# Patient Record
Sex: MALE | Race: WHITE | Employment: OTHER | URBAN - NONMETROPOLITAN AREA
[De-identification: names, ages, dates, MRNs, and addresses within clinical notes are randomized per-mention and may not be internally consistent; named-entity substitution may affect disease eponyms.]

---

## 2020-01-01 ENCOUNTER — APPOINTMENT (OUTPATIENT)
Dept: CT IMAGING | Facility: HOSPITAL | Age: 85
DRG: 283 | End: 2020-01-01
Attending: EMERGENCY MEDICINE
Payer: COMMERCIAL

## 2020-01-01 ENCOUNTER — APPOINTMENT (OUTPATIENT)
Dept: GENERAL RADIOLOGY | Facility: HOSPITAL | Age: 85
DRG: 283 | End: 2020-01-01
Attending: EMERGENCY MEDICINE
Payer: COMMERCIAL

## 2020-01-01 ENCOUNTER — APPOINTMENT (OUTPATIENT)
Dept: GENERAL RADIOLOGY | Facility: HOSPITAL | Age: 85
DRG: 283 | End: 2020-01-01
Attending: INTERNAL MEDICINE
Payer: COMMERCIAL

## 2020-01-01 ENCOUNTER — APPOINTMENT (OUTPATIENT)
Dept: GENERAL RADIOLOGY | Facility: HOSPITAL | Age: 85
DRG: 283 | End: 2020-01-01
Attending: NURSE PRACTITIONER
Payer: COMMERCIAL

## 2020-01-01 ENCOUNTER — HOSPITAL ENCOUNTER (INPATIENT)
Dept: CARDIOLOGY | Facility: HOSPITAL | Age: 85
DRG: 283 | End: 2020-10-21
Attending: INTERNAL MEDICINE
Payer: COMMERCIAL

## 2020-01-01 ENCOUNTER — HOSPITAL ENCOUNTER (INPATIENT)
Facility: HOSPITAL | Age: 85
LOS: 7 days | DRG: 283 | End: 2020-10-27
Attending: EMERGENCY MEDICINE | Admitting: INTERNAL MEDICINE
Payer: COMMERCIAL

## 2020-01-01 ENCOUNTER — APPOINTMENT (OUTPATIENT)
Dept: PHYSICAL THERAPY | Facility: HOSPITAL | Age: 85
DRG: 283 | End: 2020-01-01
Attending: NURSE PRACTITIONER
Payer: COMMERCIAL

## 2020-01-01 ENCOUNTER — APPOINTMENT (OUTPATIENT)
Dept: SPEECH THERAPY | Facility: HOSPITAL | Age: 85
DRG: 283 | End: 2020-01-01
Payer: COMMERCIAL

## 2020-01-01 ENCOUNTER — APPOINTMENT (OUTPATIENT)
Dept: SPEECH THERAPY | Facility: HOSPITAL | Age: 85
DRG: 283 | End: 2020-01-01
Attending: NURSE PRACTITIONER
Payer: COMMERCIAL

## 2020-01-01 VITALS
HEART RATE: 72 BPM | TEMPERATURE: 98.2 F | OXYGEN SATURATION: 71 % | RESPIRATION RATE: 28 BRPM | WEIGHT: 140.87 LBS | DIASTOLIC BLOOD PRESSURE: 30 MMHG | HEIGHT: 66 IN | SYSTOLIC BLOOD PRESSURE: 73 MMHG | BODY MASS INDEX: 22.64 KG/M2

## 2020-01-01 DIAGNOSIS — M62.81 GENERALIZED MUSCLE WEAKNESS: ICD-10-CM

## 2020-01-01 DIAGNOSIS — J18.9 PNEUMONIA OF LEFT UPPER LOBE DUE TO INFECTIOUS ORGANISM: Primary | ICD-10-CM

## 2020-01-01 DIAGNOSIS — I21.4 NSTEMI (NON-ST ELEVATED MYOCARDIAL INFARCTION) (H): ICD-10-CM

## 2020-01-01 DIAGNOSIS — R80.9 PROTEINURIA, UNSPECIFIED TYPE: ICD-10-CM

## 2020-01-01 LAB
ALBUMIN SERPL-MCNC: 2.1 G/DL (ref 3.4–5)
ALBUMIN SERPL-MCNC: 2.1 G/DL (ref 3.4–5)
ALBUMIN SERPL-MCNC: 2.4 G/DL (ref 3.4–5)
ALBUMIN UR-MCNC: 100 MG/DL
ALBUMIN UR-MCNC: 30 MG/DL
ALP SERPL-CCNC: 68 U/L (ref 40–150)
ALP SERPL-CCNC: 70 U/L (ref 40–150)
ALP SERPL-CCNC: 75 U/L (ref 40–150)
ALT SERPL W P-5'-P-CCNC: 39 U/L (ref 0–70)
ALT SERPL W P-5'-P-CCNC: 49 U/L (ref 0–70)
ALT SERPL W P-5'-P-CCNC: 52 U/L (ref 0–70)
AMORPH CRY #/AREA URNS HPF: ABNORMAL /HPF
AMORPH CRY #/AREA URNS HPF: ABNORMAL /HPF
ANION GAP SERPL CALCULATED.3IONS-SCNC: 6 MMOL/L (ref 3–14)
ANION GAP SERPL CALCULATED.3IONS-SCNC: 6 MMOL/L (ref 3–14)
ANION GAP SERPL CALCULATED.3IONS-SCNC: 7 MMOL/L (ref 3–14)
ANION GAP SERPL CALCULATED.3IONS-SCNC: 7 MMOL/L (ref 3–14)
ANION GAP SERPL CALCULATED.3IONS-SCNC: 8 MMOL/L (ref 3–14)
ANION GAP SERPL CALCULATED.3IONS-SCNC: 9 MMOL/L (ref 3–14)
ANION GAP SERPL CALCULATED.3IONS-SCNC: 9 MMOL/L (ref 3–14)
APPEARANCE UR: ABNORMAL
APPEARANCE UR: ABNORMAL
AST SERPL W P-5'-P-CCNC: 135 U/L (ref 0–45)
AST SERPL W P-5'-P-CCNC: 79 U/L (ref 0–45)
AST SERPL W P-5'-P-CCNC: 98 U/L (ref 0–45)
BACTERIA #/AREA URNS HPF: ABNORMAL /HPF
BACTERIA #/AREA URNS HPF: ABNORMAL /HPF
BACTERIA SPEC CULT: ABNORMAL
BACTERIA SPEC CULT: ABNORMAL
BACTERIA SPEC CULT: NORMAL
BASE DEFICIT BLDA-SCNC: 2.1 MMOL/L
BASOPHILS # BLD AUTO: 0 10E9/L (ref 0–0.2)
BASOPHILS NFR BLD AUTO: 0.2 %
BASOPHILS NFR BLD AUTO: 0.3 %
BASOPHILS NFR BLD AUTO: 0.3 %
BILIRUB SERPL-MCNC: 0.3 MG/DL (ref 0.2–1.3)
BILIRUB SERPL-MCNC: 0.3 MG/DL (ref 0.2–1.3)
BILIRUB SERPL-MCNC: 0.4 MG/DL (ref 0.2–1.3)
BILIRUB UR QL STRIP: NEGATIVE
BILIRUB UR QL STRIP: NEGATIVE
BUN SERPL-MCNC: 38 MG/DL (ref 7–30)
BUN SERPL-MCNC: 41 MG/DL (ref 7–30)
BUN SERPL-MCNC: 50 MG/DL (ref 7–30)
BUN SERPL-MCNC: 56 MG/DL (ref 7–30)
BUN SERPL-MCNC: 58 MG/DL (ref 7–30)
BUN SERPL-MCNC: 68 MG/DL (ref 7–30)
BUN SERPL-MCNC: 69 MG/DL (ref 7–30)
BUN SERPL-MCNC: 72 MG/DL (ref 7–30)
BUN SERPL-MCNC: 73 MG/DL (ref 7–30)
C DIFF TOX B STL QL: NEGATIVE
CALCIUM SERPL-MCNC: 7.8 MG/DL (ref 8.5–10.1)
CALCIUM SERPL-MCNC: 8 MG/DL (ref 8.5–10.1)
CALCIUM SERPL-MCNC: 8 MG/DL (ref 8.5–10.1)
CALCIUM SERPL-MCNC: 8.1 MG/DL (ref 8.5–10.1)
CALCIUM SERPL-MCNC: 8.2 MG/DL (ref 8.5–10.1)
CALCIUM SERPL-MCNC: 8.2 MG/DL (ref 8.5–10.1)
CALCIUM SERPL-MCNC: 8.4 MG/DL (ref 8.5–10.1)
CALCIUM SERPL-MCNC: 8.4 MG/DL (ref 8.5–10.1)
CALCIUM SERPL-MCNC: 9 MG/DL (ref 8.5–10.1)
CHLORIDE SERPL-SCNC: 111 MMOL/L (ref 94–109)
CHLORIDE SERPL-SCNC: 113 MMOL/L (ref 94–109)
CHLORIDE SERPL-SCNC: 114 MMOL/L (ref 94–109)
CHLORIDE SERPL-SCNC: 117 MMOL/L (ref 94–109)
CHLORIDE SERPL-SCNC: 117 MMOL/L (ref 94–109)
CHLORIDE SERPL-SCNC: 118 MMOL/L (ref 94–109)
CHLORIDE SERPL-SCNC: 120 MMOL/L (ref 94–109)
CHLORIDE SERPL-SCNC: 121 MMOL/L (ref 94–109)
CHLORIDE SERPL-SCNC: 122 MMOL/L (ref 94–109)
CK SERPL-CCNC: 279 U/L (ref 30–300)
CO2 SERPL-SCNC: 20 MMOL/L (ref 20–32)
CO2 SERPL-SCNC: 21 MMOL/L (ref 20–32)
CO2 SERPL-SCNC: 22 MMOL/L (ref 20–32)
CO2 SERPL-SCNC: 23 MMOL/L (ref 20–32)
CO2 SERPL-SCNC: 24 MMOL/L (ref 20–32)
CO2 SERPL-SCNC: 25 MMOL/L (ref 20–32)
CO2 SERPL-SCNC: 25 MMOL/L (ref 20–32)
COLOR UR AUTO: YELLOW
COLOR UR AUTO: YELLOW
CREAT SERPL-MCNC: 1.4 MG/DL (ref 0.66–1.25)
CREAT SERPL-MCNC: 1.42 MG/DL (ref 0.66–1.25)
CREAT SERPL-MCNC: 1.6 MG/DL (ref 0.66–1.25)
CREAT SERPL-MCNC: 1.65 MG/DL (ref 0.66–1.25)
CREAT SERPL-MCNC: 1.65 MG/DL (ref 0.66–1.25)
CREAT SERPL-MCNC: 2.01 MG/DL (ref 0.66–1.25)
CREAT SERPL-MCNC: 2.04 MG/DL (ref 0.66–1.25)
CREAT SERPL-MCNC: 2.05 MG/DL (ref 0.66–1.25)
CREAT SERPL-MCNC: 2.15 MG/DL (ref 0.66–1.25)
CRP SERPL-MCNC: 148 MG/L (ref 0–8)
CRP SERPL-MCNC: 158 MG/L (ref 0–8)
D DIMER PPP FEU-MCNC: 1.8 UG/ML FEU (ref 0–0.5)
DIFFERENTIAL METHOD BLD: ABNORMAL
EOSINOPHIL # BLD AUTO: 0 10E9/L (ref 0–0.7)
EOSINOPHIL NFR BLD AUTO: 0 %
EOSINOPHIL NFR BLD AUTO: 0.1 %
EOSINOPHIL NFR BLD AUTO: 0.1 %
EOSINOPHIL NFR BLD AUTO: 0.2 %
EOSINOPHIL NFR BLD AUTO: 0.3 %
ERYTHROCYTE [DISTWIDTH] IN BLOOD BY AUTOMATED COUNT: 15.6 % (ref 10–15)
ERYTHROCYTE [DISTWIDTH] IN BLOOD BY AUTOMATED COUNT: 15.8 % (ref 10–15)
ERYTHROCYTE [DISTWIDTH] IN BLOOD BY AUTOMATED COUNT: 15.8 % (ref 10–15)
ERYTHROCYTE [DISTWIDTH] IN BLOOD BY AUTOMATED COUNT: 15.9 % (ref 10–15)
ERYTHROCYTE [DISTWIDTH] IN BLOOD BY AUTOMATED COUNT: 16 % (ref 10–15)
ERYTHROCYTE [DISTWIDTH] IN BLOOD BY AUTOMATED COUNT: 16.2 % (ref 10–15)
ERYTHROCYTE [DISTWIDTH] IN BLOOD BY AUTOMATED COUNT: 16.3 % (ref 10–15)
GFR SERPL CREATININE-BSD FRML MDRD: 24 ML/MIN/{1.73_M2}
GFR SERPL CREATININE-BSD FRML MDRD: 25 ML/MIN/{1.73_M2}
GFR SERPL CREATININE-BSD FRML MDRD: 25 ML/MIN/{1.73_M2}
GFR SERPL CREATININE-BSD FRML MDRD: 26 ML/MIN/{1.73_M2}
GFR SERPL CREATININE-BSD FRML MDRD: 33 ML/MIN/{1.73_M2}
GFR SERPL CREATININE-BSD FRML MDRD: 33 ML/MIN/{1.73_M2}
GFR SERPL CREATININE-BSD FRML MDRD: 34 ML/MIN/{1.73_M2}
GFR SERPL CREATININE-BSD FRML MDRD: 39 ML/MIN/{1.73_M2}
GFR SERPL CREATININE-BSD FRML MDRD: 40 ML/MIN/{1.73_M2}
GLUCOSE SERPL-MCNC: 109 MG/DL (ref 70–99)
GLUCOSE SERPL-MCNC: 114 MG/DL (ref 70–99)
GLUCOSE SERPL-MCNC: 122 MG/DL (ref 70–99)
GLUCOSE SERPL-MCNC: 131 MG/DL (ref 70–99)
GLUCOSE SERPL-MCNC: 133 MG/DL (ref 70–99)
GLUCOSE SERPL-MCNC: 143 MG/DL (ref 70–99)
GLUCOSE SERPL-MCNC: 145 MG/DL (ref 70–99)
GLUCOSE SERPL-MCNC: 160 MG/DL (ref 70–99)
GLUCOSE SERPL-MCNC: 165 MG/DL (ref 70–99)
GLUCOSE UR STRIP-MCNC: 30 MG/DL
GLUCOSE UR STRIP-MCNC: NEGATIVE MG/DL
GRAM STN SPEC: ABNORMAL
GRAN CASTS #/AREA URNS LPF: 14 /LPF
HCO3 BLD-SCNC: 22 MMOL/L (ref 21–28)
HCT VFR BLD AUTO: 27.1 % (ref 40–53)
HCT VFR BLD AUTO: 27.1 % (ref 40–53)
HCT VFR BLD AUTO: 27.3 % (ref 40–53)
HCT VFR BLD AUTO: 27.4 % (ref 40–53)
HCT VFR BLD AUTO: 29.2 % (ref 40–53)
HCT VFR BLD AUTO: 29.3 % (ref 40–53)
HCT VFR BLD AUTO: 34.6 % (ref 40–53)
HGB BLD-MCNC: 11.4 G/DL (ref 13.3–17.7)
HGB BLD-MCNC: 9 G/DL (ref 13.3–17.7)
HGB BLD-MCNC: 9.1 G/DL (ref 13.3–17.7)
HGB BLD-MCNC: 9.2 G/DL (ref 13.3–17.7)
HGB BLD-MCNC: 9.2 G/DL (ref 13.3–17.7)
HGB BLD-MCNC: 9.6 G/DL (ref 13.3–17.7)
HGB BLD-MCNC: 9.8 G/DL (ref 13.3–17.7)
HGB UR QL STRIP: ABNORMAL
HGB UR QL STRIP: ABNORMAL
HYALINE CASTS #/AREA URNS LPF: 1 /LPF
HYALINE CASTS #/AREA URNS LPF: 5 /LPF
IMM GRANULOCYTES # BLD: 0.1 10E9/L (ref 0–0.4)
IMM GRANULOCYTES # BLD: 0.1 10E9/L (ref 0–0.4)
IMM GRANULOCYTES # BLD: 0.2 10E9/L (ref 0–0.4)
IMM GRANULOCYTES # BLD: 0.2 10E9/L (ref 0–0.4)
IMM GRANULOCYTES # BLD: 0.3 10E9/L (ref 0–0.4)
IMM GRANULOCYTES NFR BLD: 0.7 %
IMM GRANULOCYTES NFR BLD: 0.9 %
IMM GRANULOCYTES NFR BLD: 1.4 %
IMM GRANULOCYTES NFR BLD: 1.9 %
IMM GRANULOCYTES NFR BLD: 1.9 %
KETONES UR STRIP-MCNC: NEGATIVE MG/DL
KETONES UR STRIP-MCNC: NEGATIVE MG/DL
LABORATORY COMMENT REPORT: NORMAL
LACTATE BLD-SCNC: 1.6 MMOL/L (ref 0.7–2)
LACTATE BLD-SCNC: 3.4 MMOL/L (ref 0.7–2)
LEUKOCYTE ESTERASE UR QL STRIP: NEGATIVE
LEUKOCYTE ESTERASE UR QL STRIP: NEGATIVE
LIPASE SERPL-CCNC: 68 U/L (ref 73–393)
LYMPHOCYTES # BLD AUTO: 0.3 10E9/L (ref 0.8–5.3)
LYMPHOCYTES # BLD AUTO: 0.4 10E9/L (ref 0.8–5.3)
LYMPHOCYTES # BLD AUTO: 0.4 10E9/L (ref 0.8–5.3)
LYMPHOCYTES # BLD AUTO: 0.5 10E9/L (ref 0.8–5.3)
LYMPHOCYTES # BLD AUTO: 0.7 10E9/L (ref 0.8–5.3)
LYMPHOCYTES NFR BLD AUTO: 2.6 %
LYMPHOCYTES NFR BLD AUTO: 3.4 %
LYMPHOCYTES NFR BLD AUTO: 3.5 %
LYMPHOCYTES NFR BLD AUTO: 5.1 %
LYMPHOCYTES NFR BLD AUTO: 5.9 %
Lab: NORMAL
MCH RBC QN AUTO: 32.3 PG (ref 26.5–33)
MCH RBC QN AUTO: 32.5 PG (ref 26.5–33)
MCH RBC QN AUTO: 32.7 PG (ref 26.5–33)
MCH RBC QN AUTO: 32.8 PG (ref 26.5–33)
MCH RBC QN AUTO: 32.9 PG (ref 26.5–33)
MCH RBC QN AUTO: 33.1 PG (ref 26.5–33)
MCH RBC QN AUTO: 33.1 PG (ref 26.5–33)
MCHC RBC AUTO-ENTMCNC: 32.8 G/DL (ref 31.5–36.5)
MCHC RBC AUTO-ENTMCNC: 32.9 G/DL (ref 31.5–36.5)
MCHC RBC AUTO-ENTMCNC: 33.2 G/DL (ref 31.5–36.5)
MCHC RBC AUTO-ENTMCNC: 33.3 G/DL (ref 31.5–36.5)
MCHC RBC AUTO-ENTMCNC: 33.6 G/DL (ref 31.5–36.5)
MCHC RBC AUTO-ENTMCNC: 33.6 G/DL (ref 31.5–36.5)
MCHC RBC AUTO-ENTMCNC: 33.9 G/DL (ref 31.5–36.5)
MCV RBC AUTO: 100 FL (ref 78–100)
MCV RBC AUTO: 97 FL (ref 78–100)
MCV RBC AUTO: 98 FL (ref 78–100)
MCV RBC AUTO: 98 FL (ref 78–100)
MCV RBC AUTO: 99 FL (ref 78–100)
MONOCYTES # BLD AUTO: 0.5 10E9/L (ref 0–1.3)
MONOCYTES # BLD AUTO: 0.6 10E9/L (ref 0–1.3)
MONOCYTES # BLD AUTO: 0.7 10E9/L (ref 0–1.3)
MONOCYTES NFR BLD AUTO: 4.9 %
MONOCYTES NFR BLD AUTO: 5 %
MONOCYTES NFR BLD AUTO: 5.7 %
MONOCYTES NFR BLD AUTO: 5.7 %
MONOCYTES NFR BLD AUTO: 6.2 %
MUCOUS THREADS #/AREA URNS LPF: PRESENT /LPF
MUCOUS THREADS #/AREA URNS LPF: PRESENT /LPF
NEUTROPHILS # BLD AUTO: 10.1 10E9/L (ref 1.6–8.3)
NEUTROPHILS # BLD AUTO: 10.5 10E9/L (ref 1.6–8.3)
NEUTROPHILS # BLD AUTO: 11.3 10E9/L (ref 1.6–8.3)
NEUTROPHILS # BLD AUTO: 11.9 10E9/L (ref 1.6–8.3)
NEUTROPHILS # BLD AUTO: 7.3 10E9/L (ref 1.6–8.3)
NEUTROPHILS NFR BLD AUTO: 86.6 %
NEUTROPHILS NFR BLD AUTO: 87.6 %
NEUTROPHILS NFR BLD AUTO: 89.1 %
NEUTROPHILS NFR BLD AUTO: 89.5 %
NEUTROPHILS NFR BLD AUTO: 90.5 %
NITRATE UR QL: NEGATIVE
NITRATE UR QL: NEGATIVE
NRBC # BLD AUTO: 0 10*3/UL
NRBC # BLD AUTO: 0.1 10*3/UL
NRBC BLD AUTO-RTO: 0 /100
NRBC BLD AUTO-RTO: 1 /100
NT-PROBNP SERPL-MCNC: ABNORMAL PG/ML (ref 0–1800)
O2/TOTAL GAS SETTING VFR VENT: 21 %
OXYHGB MFR BLD: 93 % (ref 92–100)
PCO2 BLD: 33 MM HG (ref 35–45)
PH BLD: 7.43 PH (ref 7.35–7.45)
PH UR STRIP: 5.5 PH (ref 4.7–8)
PH UR STRIP: 5.5 PH (ref 4.7–8)
PLATELET # BLD AUTO: 325 10E9/L (ref 150–450)
PLATELET # BLD AUTO: 353 10E9/L (ref 150–450)
PLATELET # BLD AUTO: 366 10E9/L (ref 150–450)
PLATELET # BLD AUTO: 417 10E9/L (ref 150–450)
PLATELET # BLD AUTO: 476 10E9/L (ref 150–450)
PLATELET # BLD AUTO: 478 10E9/L (ref 150–450)
PLATELET # BLD AUTO: 482 10E9/L (ref 150–450)
PO2 BLD: 70 MM HG (ref 80–105)
POTASSIUM SERPL-SCNC: 2.9 MMOL/L (ref 3.4–5.3)
POTASSIUM SERPL-SCNC: 3.2 MMOL/L (ref 3.4–5.3)
POTASSIUM SERPL-SCNC: 3.5 MMOL/L (ref 3.4–5.3)
POTASSIUM SERPL-SCNC: 3.6 MMOL/L (ref 3.4–5.3)
POTASSIUM SERPL-SCNC: 3.6 MMOL/L (ref 3.4–5.3)
POTASSIUM SERPL-SCNC: 3.7 MMOL/L (ref 3.4–5.3)
POTASSIUM SERPL-SCNC: 3.8 MMOL/L (ref 3.4–5.3)
PROCALCITONIN SERPL-MCNC: 0.25 NG/ML
PROCALCITONIN SERPL-MCNC: 0.32 NG/ML
PROCALCITONIN SERPL-MCNC: 0.4 NG/ML
PROCALCITONIN SERPL-MCNC: 0.62 NG/ML
PROCALCITONIN SERPL-MCNC: 0.8 NG/ML
PROCALCITONIN SERPL-MCNC: 1.29 NG/ML
PROT SERPL-MCNC: 5.8 G/DL (ref 6.8–8.8)
PROT SERPL-MCNC: 6 G/DL (ref 6.8–8.8)
PROT SERPL-MCNC: 6 G/DL (ref 6.8–8.8)
RBC # BLD AUTO: 2.74 10E12/L (ref 4.4–5.9)
RBC # BLD AUTO: 2.78 10E12/L (ref 4.4–5.9)
RBC # BLD AUTO: 2.78 10E12/L (ref 4.4–5.9)
RBC # BLD AUTO: 2.8 10E12/L (ref 4.4–5.9)
RBC # BLD AUTO: 2.97 10E12/L (ref 4.4–5.9)
RBC # BLD AUTO: 3 10E12/L (ref 4.4–5.9)
RBC # BLD AUTO: 3.46 10E12/L (ref 4.4–5.9)
RBC #/AREA URNS AUTO: 2 /HPF (ref 0–2)
RBC #/AREA URNS AUTO: 4 /HPF (ref 0–2)
SARS-COV-2 RNA SPEC QL NAA+PROBE: NEGATIVE
SARS-COV-2 RNA SPEC QL NAA+PROBE: NORMAL
SARS-COV-2 RNA SPEC QL NAA+PROBE: NOT DETECTED
SODIUM SERPL-SCNC: 143 MMOL/L (ref 133–144)
SODIUM SERPL-SCNC: 144 MMOL/L (ref 133–144)
SODIUM SERPL-SCNC: 144 MMOL/L (ref 133–144)
SODIUM SERPL-SCNC: 146 MMOL/L (ref 133–144)
SODIUM SERPL-SCNC: 148 MMOL/L (ref 133–144)
SODIUM SERPL-SCNC: 149 MMOL/L (ref 133–144)
SODIUM SERPL-SCNC: 150 MMOL/L (ref 133–144)
SODIUM SERPL-SCNC: 151 MMOL/L (ref 133–144)
SODIUM SERPL-SCNC: 152 MMOL/L (ref 133–144)
SOURCE: ABNORMAL
SOURCE: ABNORMAL
SP GR UR STRIP: 1.02 (ref 1–1.03)
SP GR UR STRIP: 1.02 (ref 1–1.03)
SPECIMEN SOURCE: ABNORMAL
SPECIMEN SOURCE: NORMAL
SQUAMOUS #/AREA URNS AUTO: 0 /HPF (ref 0–1)
SQUAMOUS #/AREA URNS AUTO: 0 /HPF (ref 0–1)
TROPONIN I SERPL-MCNC: 12.64 UG/L (ref 0–0.04)
TROPONIN I SERPL-MCNC: 14.7 UG/L (ref 0–0.04)
TROPONIN I SERPL-MCNC: 19.86 UG/L (ref 0–0.04)
TROPONIN I SERPL-MCNC: 26.44 UG/L (ref 0–0.04)
TROPONIN I SERPL-MCNC: 29 UG/L (ref 0–0.04)
TROPONIN I SERPL-MCNC: 4.52 UG/L (ref 0–0.04)
TROPONIN I SERPL-MCNC: 5.79 UG/L (ref 0–0.04)
TROPONIN I SERPL-MCNC: 6.71 UG/L (ref 0–0.04)
TROPONIN I SERPL-MCNC: 7.55 UG/L (ref 0–0.04)
TROPONIN I SERPL-MCNC: 8.24 UG/L (ref 0–0.04)
TSH SERPL DL<=0.005 MIU/L-ACNC: 0.22 MU/L (ref 0.4–4)
UROBILINOGEN UR STRIP-MCNC: NORMAL MG/DL (ref 0–2)
UROBILINOGEN UR STRIP-MCNC: NORMAL MG/DL (ref 0–2)
WBC # BLD AUTO: 11.6 10E9/L (ref 4–11)
WBC # BLD AUTO: 11.8 10E9/L (ref 4–11)
WBC # BLD AUTO: 12.5 10E9/L (ref 4–11)
WBC # BLD AUTO: 13.3 10E9/L (ref 4–11)
WBC # BLD AUTO: 8.4 10E9/L (ref 4–11)
WBC # BLD AUTO: 8.8 10E9/L (ref 4–11)
WBC # BLD AUTO: 9.9 10E9/L (ref 4–11)
WBC #/AREA URNS AUTO: 6 /HPF (ref 0–5)
WBC #/AREA URNS AUTO: 7 /HPF (ref 0–5)

## 2020-01-01 PROCEDURE — 250N000011 HC RX IP 250 OP 636: Performed by: INTERNAL MEDICINE

## 2020-01-01 PROCEDURE — 36415 COLL VENOUS BLD VENIPUNCTURE: CPT | Performed by: NURSE PRACTITIONER

## 2020-01-01 PROCEDURE — 120N000001 HC R&B MED SURG/OB

## 2020-01-01 PROCEDURE — 80053 COMPREHEN METABOLIC PANEL: CPT | Performed by: INTERNAL MEDICINE

## 2020-01-01 PROCEDURE — 250N000011 HC RX IP 250 OP 636: Performed by: EMERGENCY MEDICINE

## 2020-01-01 PROCEDURE — 93010 ELECTROCARDIOGRAM REPORT: CPT | Performed by: INTERNAL MEDICINE

## 2020-01-01 PROCEDURE — 250N000013 HC RX MED GY IP 250 OP 250 PS 637: Performed by: INTERNAL MEDICINE

## 2020-01-01 PROCEDURE — 83880 ASSAY OF NATRIURETIC PEPTIDE: CPT | Performed by: EMERGENCY MEDICINE

## 2020-01-01 PROCEDURE — 999N000157 HC STATISTIC RCP TIME EA 10 MIN

## 2020-01-01 PROCEDURE — 80048 BASIC METABOLIC PNL TOTAL CA: CPT | Performed by: NURSE PRACTITIONER

## 2020-01-01 PROCEDURE — 99233 SBSQ HOSP IP/OBS HIGH 50: CPT | Performed by: NURSE PRACTITIONER

## 2020-01-01 PROCEDURE — 87040 BLOOD CULTURE FOR BACTERIA: CPT | Performed by: EMERGENCY MEDICINE

## 2020-01-01 PROCEDURE — 83605 ASSAY OF LACTIC ACID: CPT | Performed by: INTERNAL MEDICINE

## 2020-01-01 PROCEDURE — 85027 COMPLETE CBC AUTOMATED: CPT | Performed by: INTERNAL MEDICINE

## 2020-01-01 PROCEDURE — 85025 COMPLETE CBC W/AUTO DIFF WBC: CPT | Performed by: NURSE PRACTITIONER

## 2020-01-01 PROCEDURE — 36600 WITHDRAWAL OF ARTERIAL BLOOD: CPT

## 2020-01-01 PROCEDURE — 99233 SBSQ HOSP IP/OBS HIGH 50: CPT | Performed by: INTERNAL MEDICINE

## 2020-01-01 PROCEDURE — 258N000003 HC RX IP 258 OP 636: Performed by: INTERNAL MEDICINE

## 2020-01-01 PROCEDURE — 255N000002 HC RX 255 OP 636: Performed by: INTERNAL MEDICINE

## 2020-01-01 PROCEDURE — 99223 1ST HOSP IP/OBS HIGH 75: CPT | Performed by: INTERNAL MEDICINE

## 2020-01-01 PROCEDURE — 86140 C-REACTIVE PROTEIN: CPT | Performed by: NURSE PRACTITIONER

## 2020-01-01 PROCEDURE — 250N000013 HC RX MED GY IP 250 OP 250 PS 637: Performed by: EMERGENCY MEDICINE

## 2020-01-01 PROCEDURE — 87070 CULTURE OTHR SPECIMN AEROBIC: CPT | Performed by: INTERNAL MEDICINE

## 2020-01-01 PROCEDURE — 84145 PROCALCITONIN (PCT): CPT | Performed by: NURSE PRACTITIONER

## 2020-01-01 PROCEDURE — 85379 FIBRIN DEGRADATION QUANT: CPT | Performed by: EMERGENCY MEDICINE

## 2020-01-01 PROCEDURE — 93306 TTE W/DOPPLER COMPLETE: CPT | Mod: 26 | Performed by: INTERNAL MEDICINE

## 2020-01-01 PROCEDURE — 84145 PROCALCITONIN (PCT): CPT | Performed by: INTERNAL MEDICINE

## 2020-01-01 PROCEDURE — 84145 PROCALCITONIN (PCT): CPT | Performed by: EMERGENCY MEDICINE

## 2020-01-01 PROCEDURE — 87205 SMEAR GRAM STAIN: CPT | Performed by: NURSE PRACTITIONER

## 2020-01-01 PROCEDURE — 94640 AIRWAY INHALATION TREATMENT: CPT

## 2020-01-01 PROCEDURE — 96367 TX/PROPH/DG ADDL SEQ IV INF: CPT

## 2020-01-01 PROCEDURE — 250N000011 HC RX IP 250 OP 636: Performed by: RADIOLOGY

## 2020-01-01 PROCEDURE — 250N000013 HC RX MED GY IP 250 OP 250 PS 637: Performed by: NURSE PRACTITIONER

## 2020-01-01 PROCEDURE — 258N000003 HC RX IP 258 OP 636: Performed by: EMERGENCY MEDICINE

## 2020-01-01 PROCEDURE — 85025 COMPLETE CBC W/AUTO DIFF WBC: CPT | Performed by: EMERGENCY MEDICINE

## 2020-01-01 PROCEDURE — 250N000009 HC RX 250

## 2020-01-01 PROCEDURE — 120N000004 HC R&B MS OVERFLOW

## 2020-01-01 PROCEDURE — 92610 EVALUATE SWALLOWING FUNCTION: CPT | Mod: GN

## 2020-01-01 PROCEDURE — 87635 SARS-COV-2 COVID-19 AMP PRB: CPT | Performed by: NURSE PRACTITIONER

## 2020-01-01 PROCEDURE — 73523 X-RAY EXAM HIPS BI 5/> VIEWS: CPT

## 2020-01-01 PROCEDURE — 93005 ELECTROCARDIOGRAM TRACING: CPT

## 2020-01-01 PROCEDURE — 84484 ASSAY OF TROPONIN QUANT: CPT | Performed by: NURSE PRACTITIONER

## 2020-01-01 PROCEDURE — 96368 THER/DIAG CONCURRENT INF: CPT

## 2020-01-01 PROCEDURE — 258N000003 HC RX IP 258 OP 636: Performed by: NURSE PRACTITIONER

## 2020-01-01 PROCEDURE — 250N000011 HC RX IP 250 OP 636: Performed by: NURSE PRACTITIONER

## 2020-01-01 PROCEDURE — 36415 COLL VENOUS BLD VENIPUNCTURE: CPT | Performed by: INTERNAL MEDICINE

## 2020-01-01 PROCEDURE — 87205 SMEAR GRAM STAIN: CPT | Performed by: INTERNAL MEDICINE

## 2020-01-01 PROCEDURE — 99284 EMERGENCY DEPT VISIT MOD MDM: CPT | Performed by: EMERGENCY MEDICINE

## 2020-01-01 PROCEDURE — 82550 ASSAY OF CK (CPK): CPT | Performed by: EMERGENCY MEDICINE

## 2020-01-01 PROCEDURE — 99238 HOSP IP/OBS DSCHRG MGMT 30/<: CPT | Performed by: INTERNAL MEDICINE

## 2020-01-01 PROCEDURE — 80048 BASIC METABOLIC PNL TOTAL CA: CPT | Performed by: EMERGENCY MEDICINE

## 2020-01-01 PROCEDURE — U0003 INFECTIOUS AGENT DETECTION BY NUCLEIC ACID (DNA OR RNA); SEVERE ACUTE RESPIRATORY SYNDROME CORONAVIRUS 2 (SARS-COV-2) (CORONAVIRUS DISEASE [COVID-19]), AMPLIFIED PROBE TECHNIQUE, MAKING USE OF HIGH THROUGHPUT TECHNOLOGIES AS DESCRIBED BY CMS-2020-01-R: HCPCS | Performed by: EMERGENCY MEDICINE

## 2020-01-01 PROCEDURE — 96361 HYDRATE IV INFUSION ADD-ON: CPT

## 2020-01-01 PROCEDURE — 96365 THER/PROPH/DIAG IV INF INIT: CPT

## 2020-01-01 PROCEDURE — 83880 ASSAY OF NATRIURETIC PEPTIDE: CPT | Performed by: NURSE PRACTITIONER

## 2020-01-01 PROCEDURE — 96366 THER/PROPH/DIAG IV INF ADDON: CPT

## 2020-01-01 PROCEDURE — 84484 ASSAY OF TROPONIN QUANT: CPT | Performed by: INTERNAL MEDICINE

## 2020-01-01 PROCEDURE — 97162 PT EVAL MOD COMPLEX 30 MIN: CPT | Mod: GP

## 2020-01-01 PROCEDURE — 71045 X-RAY EXAM CHEST 1 VIEW: CPT

## 2020-01-01 PROCEDURE — 36415 COLL VENOUS BLD VENIPUNCTURE: CPT | Performed by: EMERGENCY MEDICINE

## 2020-01-01 PROCEDURE — 80053 COMPREHEN METABOLIC PANEL: CPT | Performed by: NURSE PRACTITIONER

## 2020-01-01 PROCEDURE — 87493 C DIFF AMPLIFIED PROBE: CPT | Performed by: NURSE PRACTITIONER

## 2020-01-01 PROCEDURE — 99285 EMERGENCY DEPT VISIT HI MDM: CPT | Mod: 25

## 2020-01-01 PROCEDURE — 82805 BLOOD GASES W/O2 SATURATION: CPT | Performed by: EMERGENCY MEDICINE

## 2020-01-01 PROCEDURE — 81001 URINALYSIS AUTO W/SCOPE: CPT | Performed by: NURSE PRACTITIONER

## 2020-01-01 PROCEDURE — 71046 X-RAY EXAM CHEST 2 VIEWS: CPT

## 2020-01-01 PROCEDURE — 93005 ELECTROCARDIOGRAM TRACING: CPT | Mod: 76

## 2020-01-01 PROCEDURE — 83605 ASSAY OF LACTIC ACID: CPT | Performed by: EMERGENCY MEDICINE

## 2020-01-01 PROCEDURE — 36416 COLLJ CAPILLARY BLOOD SPEC: CPT | Performed by: INTERNAL MEDICINE

## 2020-01-01 PROCEDURE — 92526 ORAL FUNCTION THERAPY: CPT | Mod: GN

## 2020-01-01 PROCEDURE — 96376 TX/PRO/DX INJ SAME DRUG ADON: CPT

## 2020-01-01 PROCEDURE — 71275 CT ANGIOGRAPHY CHEST: CPT

## 2020-01-01 PROCEDURE — 83880 ASSAY OF NATRIURETIC PEPTIDE: CPT | Performed by: INTERNAL MEDICINE

## 2020-01-01 PROCEDURE — 86140 C-REACTIVE PROTEIN: CPT | Performed by: EMERGENCY MEDICINE

## 2020-01-01 PROCEDURE — 81001 URINALYSIS AUTO W/SCOPE: CPT | Performed by: EMERGENCY MEDICINE

## 2020-01-01 PROCEDURE — 250N000009 HC RX 250: Performed by: INTERNAL MEDICINE

## 2020-01-01 PROCEDURE — 84443 ASSAY THYROID STIM HORMONE: CPT | Performed by: INTERNAL MEDICINE

## 2020-01-01 PROCEDURE — 84484 ASSAY OF TROPONIN QUANT: CPT | Performed by: EMERGENCY MEDICINE

## 2020-01-01 PROCEDURE — 83690 ASSAY OF LIPASE: CPT | Performed by: EMERGENCY MEDICINE

## 2020-01-01 RX ORDER — LORAZEPAM 2 MG/ML
1-2 INJECTION INTRAMUSCULAR
Status: DISCONTINUED | OUTPATIENT
Start: 2020-01-01 | End: 2020-01-01 | Stop reason: HOSPADM

## 2020-01-01 RX ORDER — SCOLOPAMINE TRANSDERMAL SYSTEM 1 MG/1
1 PATCH, EXTENDED RELEASE TRANSDERMAL
Status: DISCONTINUED | OUTPATIENT
Start: 2020-01-01 | End: 2020-01-01 | Stop reason: HOSPADM

## 2020-01-01 RX ORDER — DEXTROSE MONOHYDRATE 50 MG/ML
INJECTION, SOLUTION INTRAVENOUS CONTINUOUS
Status: DISCONTINUED | OUTPATIENT
Start: 2020-01-01 | End: 2020-01-01 | Stop reason: HOSPADM

## 2020-01-01 RX ORDER — POTASSIUM CHLORIDE 1.5 G/1.58G
20-40 POWDER, FOR SOLUTION ORAL
Status: DISCONTINUED | OUTPATIENT
Start: 2020-01-01 | End: 2020-01-01 | Stop reason: HOSPADM

## 2020-01-01 RX ORDER — IPRATROPIUM BROMIDE AND ALBUTEROL SULFATE 2.5; .5 MG/3ML; MG/3ML
3 SOLUTION RESPIRATORY (INHALATION)
Status: DISCONTINUED | OUTPATIENT
Start: 2020-01-01 | End: 2020-01-01

## 2020-01-01 RX ORDER — IPRATROPIUM BROMIDE AND ALBUTEROL SULFATE 2.5; .5 MG/3ML; MG/3ML
SOLUTION RESPIRATORY (INHALATION)
Status: COMPLETED
Start: 2020-01-01 | End: 2020-01-01

## 2020-01-01 RX ORDER — ASPIRIN 81 MG/1
81 TABLET ORAL DAILY
Status: DISCONTINUED | OUTPATIENT
Start: 2020-01-01 | End: 2020-01-01

## 2020-01-01 RX ORDER — IPRATROPIUM BROMIDE AND ALBUTEROL SULFATE 2.5; .5 MG/3ML; MG/3ML
3 SOLUTION RESPIRATORY (INHALATION) EVERY 4 HOURS PRN
Status: DISCONTINUED | OUTPATIENT
Start: 2020-01-01 | End: 2020-01-01 | Stop reason: HOSPADM

## 2020-01-01 RX ORDER — ASPIRIN 81 MG/1
324 TABLET, CHEWABLE ORAL ONCE
Status: COMPLETED | OUTPATIENT
Start: 2020-01-01 | End: 2020-01-01

## 2020-01-01 RX ORDER — MORPHINE SULFATE 100 MG/5ML
5-10 SOLUTION ORAL
Status: DISCONTINUED | OUTPATIENT
Start: 2020-01-01 | End: 2020-01-01

## 2020-01-01 RX ORDER — ONDANSETRON 2 MG/ML
4 INJECTION INTRAMUSCULAR; INTRAVENOUS EVERY 6 HOURS PRN
Status: DISCONTINUED | OUTPATIENT
Start: 2020-01-01 | End: 2020-01-01 | Stop reason: HOSPADM

## 2020-01-01 RX ORDER — HEPARIN SODIUM 10000 [USP'U]/100ML
0-3500 INJECTION, SOLUTION INTRAVENOUS CONTINUOUS
Status: DISCONTINUED | OUTPATIENT
Start: 2020-01-01 | End: 2020-01-01

## 2020-01-01 RX ORDER — SODIUM CHLORIDE, SODIUM LACTATE, POTASSIUM CHLORIDE, CALCIUM CHLORIDE 600; 310; 30; 20 MG/100ML; MG/100ML; MG/100ML; MG/100ML
INJECTION, SOLUTION INTRAVENOUS CONTINUOUS
Status: DISCONTINUED | OUTPATIENT
Start: 2020-01-01 | End: 2020-01-01

## 2020-01-01 RX ORDER — OLANZAPINE 2.5 MG/1
2.5 TABLET, FILM COATED ORAL 3 TIMES DAILY PRN
Status: DISCONTINUED | OUTPATIENT
Start: 2020-01-01 | End: 2020-01-01 | Stop reason: HOSPADM

## 2020-01-01 RX ORDER — POTASSIUM CHLORIDE 1500 MG/1
20-40 TABLET, EXTENDED RELEASE ORAL
Status: DISCONTINUED | OUTPATIENT
Start: 2020-01-01 | End: 2020-01-01 | Stop reason: HOSPADM

## 2020-01-01 RX ORDER — LEVOTHYROXINE SODIUM 100 UG/1
100 TABLET ORAL DAILY
Status: DISCONTINUED | OUTPATIENT
Start: 2020-01-01 | End: 2020-01-01

## 2020-01-01 RX ORDER — IOPAMIDOL 755 MG/ML
100 INJECTION, SOLUTION INTRAVASCULAR ONCE
Status: COMPLETED | OUTPATIENT
Start: 2020-01-01 | End: 2020-01-01

## 2020-01-01 RX ORDER — FUROSEMIDE 10 MG/ML
40 INJECTION INTRAMUSCULAR; INTRAVENOUS ONCE
Status: COMPLETED | OUTPATIENT
Start: 2020-01-01 | End: 2020-01-01

## 2020-01-01 RX ORDER — CEFTRIAXONE SODIUM 1 G/50ML
1 INJECTION, SOLUTION INTRAVENOUS ONCE
Status: COMPLETED | OUTPATIENT
Start: 2020-01-01 | End: 2020-01-01

## 2020-01-01 RX ORDER — ACETAMINOPHEN 325 MG/1
650 TABLET ORAL EVERY 4 HOURS PRN
Status: DISCONTINUED | OUTPATIENT
Start: 2020-01-01 | End: 2020-01-01 | Stop reason: HOSPADM

## 2020-01-01 RX ORDER — MORPHINE SULFATE 100 MG/5ML
5-15 SOLUTION ORAL
Status: DISCONTINUED | OUTPATIENT
Start: 2020-01-01 | End: 2020-01-01 | Stop reason: HOSPADM

## 2020-01-01 RX ORDER — NALOXONE HYDROCHLORIDE 0.4 MG/ML
.1-.4 INJECTION, SOLUTION INTRAMUSCULAR; INTRAVENOUS; SUBCUTANEOUS
Status: DISCONTINUED | OUTPATIENT
Start: 2020-01-01 | End: 2020-01-01 | Stop reason: HOSPADM

## 2020-01-01 RX ORDER — POTASSIUM CHLORIDE 7.45 MG/ML
10 INJECTION INTRAVENOUS
Status: DISCONTINUED | OUTPATIENT
Start: 2020-01-01 | End: 2020-01-01 | Stop reason: HOSPADM

## 2020-01-01 RX ORDER — POTASSIUM CHLORIDE 750 MG/1
40 CAPSULE, EXTENDED RELEASE ORAL ONCE
Status: COMPLETED | OUTPATIENT
Start: 2020-01-01 | End: 2020-01-01

## 2020-01-01 RX ORDER — SODIUM CHLORIDE 9 MG/ML
INJECTION, SOLUTION INTRAVENOUS CONTINUOUS
Status: DISCONTINUED | OUTPATIENT
Start: 2020-01-01 | End: 2020-01-01

## 2020-01-01 RX ORDER — ONDANSETRON 4 MG/1
4 TABLET, ORALLY DISINTEGRATING ORAL EVERY 6 HOURS PRN
Status: DISCONTINUED | OUTPATIENT
Start: 2020-01-01 | End: 2020-01-01 | Stop reason: HOSPADM

## 2020-01-01 RX ORDER — LORAZEPAM 2 MG/ML
1-2 CONCENTRATE ORAL
Status: DISCONTINUED | OUTPATIENT
Start: 2020-01-01 | End: 2020-01-01

## 2020-01-01 RX ORDER — CEFTRIAXONE SODIUM 1 G/50ML
1 INJECTION, SOLUTION INTRAVENOUS EVERY 24 HOURS
Status: DISCONTINUED | OUTPATIENT
Start: 2020-01-01 | End: 2020-01-01

## 2020-01-01 RX ORDER — DEXTROSE MONOHYDRATE 50 MG/ML
INJECTION, SOLUTION INTRAVENOUS CONTINUOUS
Status: DISPENSED | OUTPATIENT
Start: 2020-01-01 | End: 2020-01-01

## 2020-01-01 RX ORDER — POTASSIUM CL/LIDO/0.9 % NACL 10MEQ/0.1L
10 INTRAVENOUS SOLUTION, PIGGYBACK (ML) INTRAVENOUS
Status: DISCONTINUED | OUTPATIENT
Start: 2020-01-01 | End: 2020-01-01 | Stop reason: HOSPADM

## 2020-01-01 RX ORDER — MORPHINE SULFATE 100 MG/5ML
5 SOLUTION ORAL
Status: DISCONTINUED | OUTPATIENT
Start: 2020-01-01 | End: 2020-01-01

## 2020-01-01 RX ORDER — ATORVASTATIN CALCIUM 40 MG/1
80 TABLET, FILM COATED ORAL DAILY
Status: DISCONTINUED | OUTPATIENT
Start: 2020-01-01 | End: 2020-01-01

## 2020-01-01 RX ORDER — HEPARIN SODIUM 5000 [USP'U]/.5ML
5000 INJECTION, SOLUTION INTRAVENOUS; SUBCUTANEOUS EVERY 12 HOURS
Status: DISCONTINUED | OUTPATIENT
Start: 2020-01-01 | End: 2020-01-01

## 2020-01-01 RX ORDER — LIDOCAINE 40 MG/G
CREAM TOPICAL
Status: DISCONTINUED | OUTPATIENT
Start: 2020-01-01 | End: 2020-01-01 | Stop reason: HOSPADM

## 2020-01-01 RX ADMIN — MORPHINE SULFATE 5 MG: 100 SOLUTION ORAL at 12:32

## 2020-01-01 RX ADMIN — TICAGRELOR 90 MG: 90 TABLET ORAL at 21:51

## 2020-01-01 RX ADMIN — SODIUM CHLORIDE: 9 INJECTION, SOLUTION INTRAVENOUS at 13:43

## 2020-01-01 RX ADMIN — SCOPALAMINE 1 PATCH: 1 PATCH, EXTENDED RELEASE TRANSDERMAL at 22:24

## 2020-01-01 RX ADMIN — TAZOBACTAM SODIUM AND PIPERACILLIN SODIUM 2.25 G: 250; 2 INJECTION, SOLUTION INTRAVENOUS at 12:57

## 2020-01-01 RX ADMIN — TICAGRELOR 90 MG: 90 TABLET ORAL at 21:32

## 2020-01-01 RX ADMIN — MORPHINE SULFATE 10 MG: 100 SOLUTION ORAL at 21:26

## 2020-01-01 RX ADMIN — MORPHINE SULFATE 5 MG: 100 SOLUTION ORAL at 08:19

## 2020-01-01 RX ADMIN — ASPIRIN 324 MG: 81 TABLET, CHEWABLE ORAL at 13:02

## 2020-01-01 RX ADMIN — ATORVASTATIN CALCIUM 80 MG: 20 TABLET, FILM COATED ORAL at 08:17

## 2020-01-01 RX ADMIN — TICAGRELOR 90 MG: 90 TABLET ORAL at 08:17

## 2020-01-01 RX ADMIN — TICAGRELOR 90 MG: 90 TABLET ORAL at 12:44

## 2020-01-01 RX ADMIN — MORPHINE SULFATE 5 MG: 20 SOLUTION ORAL at 18:15

## 2020-01-01 RX ADMIN — MORPHINE SULFATE 5 MG: 100 SOLUTION ORAL at 17:35

## 2020-01-01 RX ADMIN — Medication 10 MEQ: at 04:08

## 2020-01-01 RX ADMIN — TICAGRELOR 90 MG: 90 TABLET ORAL at 20:31

## 2020-01-01 RX ADMIN — MORPHINE SULFATE 5 MG: 100 SOLUTION ORAL at 14:46

## 2020-01-01 RX ADMIN — ATORVASTATIN CALCIUM 80 MG: 20 TABLET, FILM COATED ORAL at 12:44

## 2020-01-01 RX ADMIN — ACETAMINOPHEN 650 MG: 325 TABLET, FILM COATED ORAL at 19:11

## 2020-01-01 RX ADMIN — TICAGRELOR 90 MG: 90 TABLET ORAL at 10:14

## 2020-01-01 RX ADMIN — FUROSEMIDE 40 MG: 10 INJECTION, SOLUTION INTRAMUSCULAR; INTRAVENOUS at 21:47

## 2020-01-01 RX ADMIN — AZITHROMYCIN MONOHYDRATE 500 MG: 500 INJECTION, POWDER, LYOPHILIZED, FOR SOLUTION INTRAVENOUS at 16:12

## 2020-01-01 RX ADMIN — TAZOBACTAM SODIUM AND PIPERACILLIN SODIUM 2.25 G: 250; 2 INJECTION, SOLUTION INTRAVENOUS at 23:34

## 2020-01-01 RX ADMIN — SODIUM CHLORIDE, POTASSIUM CHLORIDE, SODIUM LACTATE AND CALCIUM CHLORIDE: 600; 310; 30; 20 INJECTION, SOLUTION INTRAVENOUS at 06:31

## 2020-01-01 RX ADMIN — MORPHINE SULFATE 5 MG: 100 SOLUTION ORAL at 10:44

## 2020-01-01 RX ADMIN — ASPIRIN 81 MG: 81 TABLET, COATED ORAL at 09:23

## 2020-01-01 RX ADMIN — MORPHINE SULFATE 5 MG: 100 SOLUTION ORAL at 07:41

## 2020-01-01 RX ADMIN — TAZOBACTAM SODIUM AND PIPERACILLIN SODIUM 2.25 G: 250; 2 INJECTION, SOLUTION INTRAVENOUS at 17:53

## 2020-01-01 RX ADMIN — SODIUM CHLORIDE 500 ML: 9 INJECTION, SOLUTION INTRAVENOUS at 19:50

## 2020-01-01 RX ADMIN — ENOXAPARIN SODIUM 30 MG: 30 INJECTION SUBCUTANEOUS at 21:27

## 2020-01-01 RX ADMIN — ASPIRIN 81 MG: 81 TABLET, COATED ORAL at 10:15

## 2020-01-01 RX ADMIN — Medication 10 MEQ: at 03:01

## 2020-01-01 RX ADMIN — OLANZAPINE 2.5 MG: 2.5 TABLET, FILM COATED ORAL at 17:48

## 2020-01-01 RX ADMIN — OLANZAPINE 2.5 MG: 2.5 TABLET, FILM COATED ORAL at 16:37

## 2020-01-01 RX ADMIN — MORPHINE SULFATE 5 MG: 100 SOLUTION ORAL at 04:58

## 2020-01-01 RX ADMIN — LEVOTHYROXINE SODIUM 100 MCG: 0.03 TABLET ORAL at 09:24

## 2020-01-01 RX ADMIN — LORAZEPAM 1 MG: 2 INJECTION INTRAMUSCULAR; INTRAVENOUS at 21:33

## 2020-01-01 RX ADMIN — DEXTROSE MONOHYDRATE: 50 INJECTION, SOLUTION INTRAVENOUS at 21:36

## 2020-01-01 RX ADMIN — AZITHROMYCIN MONOHYDRATE 500 MG: 500 INJECTION, POWDER, LYOPHILIZED, FOR SOLUTION INTRAVENOUS at 16:57

## 2020-01-01 RX ADMIN — FUROSEMIDE 40 MG: 10 INJECTION, SOLUTION INTRAMUSCULAR; INTRAVENOUS at 19:14

## 2020-01-01 RX ADMIN — ACETAMINOPHEN 650 MG: 325 TABLET, FILM COATED ORAL at 06:48

## 2020-01-01 RX ADMIN — HEPARIN SODIUM 5000 UNITS: 5000 INJECTION, SOLUTION INTRAVENOUS; SUBCUTANEOUS at 21:44

## 2020-01-01 RX ADMIN — ENOXAPARIN SODIUM 70 MG: 80 INJECTION SUBCUTANEOUS at 18:59

## 2020-01-01 RX ADMIN — TAZOBACTAM SODIUM AND PIPERACILLIN SODIUM 2.25 G: 250; 2 INJECTION, SOLUTION INTRAVENOUS at 12:47

## 2020-01-01 RX ADMIN — PERFLUTREN 3 ML: 6.52 INJECTION, SUSPENSION INTRAVENOUS at 14:20

## 2020-01-01 RX ADMIN — LEVOTHYROXINE SODIUM 100 MCG: 0.03 TABLET ORAL at 08:17

## 2020-01-01 RX ADMIN — AZITHROMYCIN MONOHYDRATE 500 MG: 500 INJECTION, POWDER, LYOPHILIZED, FOR SOLUTION INTRAVENOUS at 16:27

## 2020-01-01 RX ADMIN — SODIUM CHLORIDE 1000 ML: 9 INJECTION, SOLUTION INTRAVENOUS at 12:17

## 2020-01-01 RX ADMIN — LEVOTHYROXINE SODIUM 125 MCG: 100 TABLET ORAL at 06:48

## 2020-01-01 RX ADMIN — MORPHINE SULFATE 5 MG: 100 SOLUTION ORAL at 00:20

## 2020-01-01 RX ADMIN — MORPHINE SULFATE 5 MG: 100 SOLUTION ORAL at 00:37

## 2020-01-01 RX ADMIN — MORPHINE SULFATE 5 MG: 100 SOLUTION ORAL at 02:35

## 2020-01-01 RX ADMIN — DEXTROSE MONOHYDRATE: 50 INJECTION, SOLUTION INTRAVENOUS at 12:11

## 2020-01-01 RX ADMIN — MORPHINE SULFATE 10 MG: 20 SOLUTION ORAL at 19:17

## 2020-01-01 RX ADMIN — MORPHINE SULFATE 5 MG: 100 SOLUTION ORAL at 23:42

## 2020-01-01 RX ADMIN — Medication 10 MEQ: at 00:55

## 2020-01-01 RX ADMIN — CEFTRIAXONE SODIUM 1 G: 1 INJECTION, SOLUTION INTRAVENOUS at 15:40

## 2020-01-01 RX ADMIN — Medication 10 MEQ: at 22:43

## 2020-01-01 RX ADMIN — ENOXAPARIN SODIUM 30 MG: 30 INJECTION SUBCUTANEOUS at 21:32

## 2020-01-01 RX ADMIN — DEXTROSE MONOHYDRATE: 50 INJECTION, SOLUTION INTRAVENOUS at 22:47

## 2020-01-01 RX ADMIN — DEXTROSE MONOHYDRATE: 50 INJECTION, SOLUTION INTRAVENOUS at 06:07

## 2020-01-01 RX ADMIN — ENOXAPARIN SODIUM 30 MG: 30 INJECTION SUBCUTANEOUS at 20:30

## 2020-01-01 RX ADMIN — LEVOTHYROXINE SODIUM 100 MCG: 0.03 TABLET ORAL at 05:24

## 2020-01-01 RX ADMIN — TAZOBACTAM SODIUM AND PIPERACILLIN SODIUM 2.25 G: 250; 2 INJECTION, SOLUTION INTRAVENOUS at 17:06

## 2020-01-01 RX ADMIN — MORPHINE SULFATE 5 MG: 100 SOLUTION ORAL at 19:18

## 2020-01-01 RX ADMIN — MORPHINE SULFATE 5 MG: 100 SOLUTION ORAL at 21:43

## 2020-01-01 RX ADMIN — AZITHROMYCIN MONOHYDRATE 500 MG: 500 INJECTION, POWDER, LYOPHILIZED, FOR SOLUTION INTRAVENOUS at 16:24

## 2020-01-01 RX ADMIN — POTASSIUM CHLORIDE 40 MEQ: 750 CAPSULE, EXTENDED RELEASE ORAL at 12:54

## 2020-01-01 RX ADMIN — TICAGRELOR 90 MG: 90 TABLET ORAL at 09:23

## 2020-01-01 RX ADMIN — AZITHROMYCIN MONOHYDRATE 500 MG: 500 INJECTION, POWDER, LYOPHILIZED, FOR SOLUTION INTRAVENOUS at 17:45

## 2020-01-01 RX ADMIN — IPRATROPIUM BROMIDE AND ALBUTEROL SULFATE 3 ML: .5; 3 SOLUTION RESPIRATORY (INHALATION) at 20:46

## 2020-01-01 RX ADMIN — TICAGRELOR 90 MG: 90 TABLET ORAL at 20:16

## 2020-01-01 RX ADMIN — ENOXAPARIN SODIUM 70 MG: 80 INJECTION SUBCUTANEOUS at 18:01

## 2020-01-01 RX ADMIN — TAZOBACTAM SODIUM AND PIPERACILLIN SODIUM 2.25 G: 250; 2 INJECTION, SOLUTION INTRAVENOUS at 18:05

## 2020-01-01 RX ADMIN — ATORVASTATIN CALCIUM 80 MG: 20 TABLET, FILM COATED ORAL at 10:14

## 2020-01-01 RX ADMIN — HEPARIN SODIUM 800 UNITS/HR: 10000 INJECTION, SOLUTION INTRAVENOUS at 13:17

## 2020-01-01 RX ADMIN — SODIUM CHLORIDE 500 ML: 9 INJECTION, SOLUTION INTRAVENOUS at 09:26

## 2020-01-01 RX ADMIN — MORPHINE SULFATE 5 MG: 100 SOLUTION ORAL at 23:46

## 2020-01-01 RX ADMIN — MORPHINE SULFATE 10 MG: 20 SOLUTION ORAL at 20:22

## 2020-01-01 RX ADMIN — Medication 10 MEQ: at 01:55

## 2020-01-01 RX ADMIN — TAZOBACTAM SODIUM AND PIPERACILLIN SODIUM 2.25 G: 250; 2 INJECTION, SOLUTION INTRAVENOUS at 12:07

## 2020-01-01 RX ADMIN — ACETAMINOPHEN 650 MG: 325 TABLET, FILM COATED ORAL at 23:25

## 2020-01-01 RX ADMIN — TAZOBACTAM SODIUM AND PIPERACILLIN SODIUM 2.25 G: 250; 2 INJECTION, SOLUTION INTRAVENOUS at 00:17

## 2020-01-01 RX ADMIN — CEFTRIAXONE SODIUM 1 G: 1 INJECTION, SOLUTION INTRAVENOUS at 15:20

## 2020-01-01 RX ADMIN — ASPIRIN 81 MG: 81 TABLET, COATED ORAL at 08:17

## 2020-01-01 RX ADMIN — HEPARIN SODIUM 5000 UNITS: 5000 INJECTION, SOLUTION INTRAVENOUS; SUBCUTANEOUS at 09:50

## 2020-01-01 RX ADMIN — SODIUM CHLORIDE, POTASSIUM CHLORIDE, SODIUM LACTATE AND CALCIUM CHLORIDE: 600; 310; 30; 20 INJECTION, SOLUTION INTRAVENOUS at 18:57

## 2020-01-01 RX ADMIN — DEXTROSE MONOHYDRATE: 50 INJECTION, SOLUTION INTRAVENOUS at 18:05

## 2020-01-01 RX ADMIN — TAZOBACTAM SODIUM AND PIPERACILLIN SODIUM 2.25 G: 250; 2 INJECTION, SOLUTION INTRAVENOUS at 06:07

## 2020-01-01 RX ADMIN — IOPAMIDOL 100 ML: 755 INJECTION, SOLUTION INTRAVENOUS at 14:15

## 2020-01-01 RX ADMIN — ACETAMINOPHEN 650 MG: 325 TABLET, FILM COATED ORAL at 17:31

## 2020-01-01 RX ADMIN — CEFTRIAXONE SODIUM 1 G: 1 INJECTION, SOLUTION INTRAVENOUS at 16:18

## 2020-01-01 RX ADMIN — TAZOBACTAM SODIUM AND PIPERACILLIN SODIUM 2.25 G: 250; 2 INJECTION, SOLUTION INTRAVENOUS at 06:31

## 2020-01-01 RX ADMIN — CEFTRIAXONE SODIUM 1 G: 1 INJECTION, SOLUTION INTRAVENOUS at 15:31

## 2020-01-01 RX ADMIN — TICAGRELOR 90 MG: 90 TABLET ORAL at 21:27

## 2020-01-01 RX ADMIN — TAZOBACTAM SODIUM AND PIPERACILLIN SODIUM 2.25 G: 250; 2 INJECTION, SOLUTION INTRAVENOUS at 23:32

## 2020-01-01 RX ADMIN — IPRATROPIUM BROMIDE AND ALBUTEROL SULFATE 3 ML: 2.5; .5 SOLUTION RESPIRATORY (INHALATION) at 20:46

## 2020-01-01 RX ADMIN — ASPIRIN 81 MG: 81 TABLET, COATED ORAL at 09:44

## 2020-01-01 ASSESSMENT — ACTIVITIES OF DAILY LIVING (ADL)
ADLS_ACUITY_SCORE: 21
ADLS_ACUITY_SCORE: 23
ADLS_ACUITY_SCORE: 23
ADLS_ACUITY_SCORE: 17
ADLS_ACUITY_SCORE: 19
ADLS_ACUITY_SCORE: 14
ADLS_ACUITY_SCORE: 22
WHICH_OF_THE_ABOVE_FUNCTIONAL_RISKS_HAD_A_RECENT_ONSET_OR_CHANGE?: AMBULATION;TRANSFERRING;COGNITION;FALL HISTORY
ADLS_ACUITY_SCORE: 21
ADLS_ACUITY_SCORE: 13
ADLS_ACUITY_SCORE: 23
ADLS_ACUITY_SCORE: 21
CONCENTRATING,_REMEMBERING_OR_MAKING_DECISIONS_DIFFICULTY: YES
ADLS_ACUITY_SCORE: 13
COMMUNICATION: DIFFICULTY UNDERSTANDING
ADLS_ACUITY_SCORE: 19
ADLS_ACUITY_SCORE: 23
ADLS_ACUITY_SCORE: 19
ADLS_ACUITY_SCORE: 23
DOING_ERRANDS_INDEPENDENTLY_DIFFICULTY: YES
ADLS_ACUITY_SCORE: 21
ADLS_ACUITY_SCORE: 17
ADLS_ACUITY_SCORE: 21
ADLS_ACUITY_SCORE: 23
ADLS_ACUITY_SCORE: 13
ADLS_ACUITY_SCORE: 21
ADLS_ACUITY_SCORE: 23
ADLS_ACUITY_SCORE: 23
ADLS_ACUITY_SCORE: 22
ADLS_ACUITY_SCORE: 23
ADLS_ACUITY_SCORE: 19
ADLS_ACUITY_SCORE: 21
ADLS_ACUITY_SCORE: 23
ADLS_ACUITY_SCORE: 23
ADLS_ACUITY_SCORE: 22
ADLS_ACUITY_SCORE: 19
ADLS_ACUITY_SCORE: 19
DIFFICULTY_COMMUNICATING: YES

## 2020-01-01 ASSESSMENT — ENCOUNTER SYMPTOMS
ABDOMINAL PAIN: 0
FEVER: 0
SHORTNESS OF BREATH: 0

## 2020-01-01 ASSESSMENT — MIFFLIN-ST. JEOR
SCORE: 1181.75
SCORE: 1176.75
SCORE: 1174.75

## 2020-10-20 PROBLEM — J18.9 PNEUMONIA: Status: ACTIVE | Noted: 2020-01-01

## 2020-10-20 PROBLEM — R80.9 PROTEINURIA, UNSPECIFIED TYPE: Status: ACTIVE | Noted: 2020-01-01

## 2020-10-20 PROBLEM — I21.4 NSTEMI (NON-ST ELEVATED MYOCARDIAL INFARCTION) (H): Status: ACTIVE | Noted: 2020-01-01

## 2020-10-20 PROBLEM — J18.9 PNEUMONIA OF LEFT UPPER LOBE DUE TO INFECTIOUS ORGANISM: Status: ACTIVE | Noted: 2020-01-01

## 2020-10-20 NOTE — ED TRIAGE NOTES
Pt lives at home indep  Pt stated the past couple of days hes been falling more and feels more weak.  Denies chest pain, sob or fevers.  Pt is alert and ornt.  Pt does report bilateral hip pains after recent falls.

## 2020-10-20 NOTE — PROVIDER NOTIFICATION
DATE:  10/20/2020   TIME OF RECEIPT FROM LAB:  1816  LAB TEST:  troponin  LAB VALUE:  5.786, increased from 4.521  RESULTS GIVEN WITH READ-BACK TO (PROVIDER):  Dr Michel Via txt pg  TIME LAB VALUE REPORTED TO PROVIDER:   1817

## 2020-10-20 NOTE — H&P
Admitted:     10/20/2020      CHIEF COMPLAINT:  Weakness.      HISTORY OF PRESENT ILLNESS:  Armand is a 89-year-old gentleman who still lives independently.  Does have several family members who look at him very closely.  He was brought to the ER today because of increasing weakness at home.  He has had a couple of falls.  He fell on Sunday and a couple times after that.  Did not hit his head at all.  The first time his granddaughter was there and helped him up.  He has been using a cane and now his family actually got him a walker but says his legs are very weak.  He has just kind of collapsed a couple of times.  This is due to weakness.  He has had no nausea or vomiting.  He has a good appetite.  No real specific chest pain although he is a little sore on his left side currently.  He has not had any coughing at all.  No fevers or shaking chills that he is aware of.  He has been peeing okay, said he pees too much.  No trouble with his bowels.  No diarrhea, constipation or bleeding anywhere.  No peripheral edema.  No double vision.  Weight has been relatively stable.  He has not been around anybody who has been sick.  He basically is stuck at home.  Does have family members who visit him; however, but no one has had any illnesses that he is aware of.  His son is there to back up most of the story.  Prior to this last week or so, he has usually been his normal self.  He gets around doing most everything that he needs to do around his home.      When he arrived in the ER, he was hemodynamically stable, pressure in the 130s, pulse in the 60s.  I do not have a temperature on him unfortunately.  No temperature was measured.  He did feel warm when I was examining him, however.  Room air sats are 98%.  He had lab evaluation and workup, which for the most part was not all that exciting.  His BUN and creatinine are elevated a little bit at 38 and 1.4.  We do not have what a baseline is.  Blood sugars were okay 140.  White  count was 12,500.  Further evaluation showed his CRP was 148.  Lactic acid was 3.4.  His lipase was normal.  His BNP was 18,700.  Procalcitonin 0.25.  Troponin was 4.5.  Blood gas was basically normal, not acidotic.  Normal sats on room air.  His D-dimer was elevated at 1.8.  They did do a CT angiogram which showed no pulmonary emboli.  Does have pneumonia; however, in that left posterior upper lobe.      EKG showed no acute changes.  So, at this point, the general thought was that he does have a pneumonia.  Has some soreness on his left chest, but does not appear to have an acute ST elevation myocardial infarction.  This may be likely a non-STEMI due to demand.  Options were discussed with the patient's family regarding very aggressive interventions such as sending him to a tertiary care center for potential angiography.  However, at this point, they wish to be DNR/DNI.  Treat him medically here with antibiotics, antiplatelet agents and treat this as a non-STEMI.      Evaluating him in the ER, he was actually quite normal.  Does not look his age at all by any means.  He does look somewhat fatigued.  Just has some mild soreness in his left chest when he takes a deep breath.  He is hungry.  He did have breakfast this morning.      PAST MEDICAL HISTORY:  Significant for:   1.  Abdominal aortic aneurysm repair.  This was an open procedure that he had done.     2.  He has history of hernia repair.   2.  History of hypothyroidism, on replacement.   3.  History of generalized arthritis.   4.  He has had some cataract surgery.      MEDICATIONS:  Only levothyroxine.  He is on 125 mcg daily.      ALLERGIES:  HE HAS NO KNOWN DRUG ALLERGIES.      SOCIAL HISTORY:  He is , lives independently, has a son who looks in on him frequently.  Smoker a couple cigarettes a day.  Nondrinker.      FAMILY HISTORY:  Positive for old age.      REVIEW OF SYSTEMS:  The pertinent positives and negatives are noted above in the HPI.       PHYSICAL EXAMINATION:   GENERAL:  He is alert, somewhat fatigued appearing elderly gentleman, looking younger than his stated age.   VITAL SIGNS:  His blood pressure is 130/65, pulse is 75 and regular.  Sats are 98% on room air.  His temperature is 100.6 degrees.   HEENT:  Normocephalic, atraumatic.  Pupils equal, round, reactive.  Sclerae are clear.   NECK:  Supple, no obvious lymphadenopathy or thyromegaly.  Mucous membranes are moist.   LUNGS:  A few decreased breath sounds, more so on the left posterior lung field.  Otherwise clear.  No wheezing.   CARDIAC:  Regular rate and rhythm, normal S1, S2, 1/2 systolic murmur left sternal border.  Neck veins are flat.  Pulses 2+ and equal.   ABDOMEN:  Has a long midline scar.  Soft, nontender, bowel sounds are positive.  No masses or organomegaly.   EXTREMITIES:  Upper extremities have numerous bruises all over his forearms.  Lower extremities have no cyanosis, clubbing or edema.   NEUROLOGIC:  He is alert and oriented x 3.  Cranial nerves are intact.  Nonfocal exam.      IMAGING:  CT angiogram shows no acute pulmonary embolism.  Does have the infiltrate in the left lung field upper lobe posteriorly.      LABORATORY DATA:  As previously, sodium 144, potassium 3.7, chloride 111, CO2 is 25, BUN 38, creatinine is 1.4, calcium is 9, glucose is 145.  White count 12,500, hemoglobin is 11.4, platelet count is 417,000.  D-dimer is 1.8.  His CK was 379.  .  Lactic acid 3.4, lipase 68.  BNP 18,780.  Procalcitonin 0.25.  Troponin 4.5.  An ABG was done with a pH of 7.43, pCO2 of 33, pO2 of 70, bicarbonate 22, 93% sat on room air.  Urinalysis:  Specific gravity of 30, 6 whites, 4 reds, 14 granular casts, 5 hyaline casts.  Blood cultures were done.  X-ray of his pelvis and hip were unremarkable showing arthritis.      ASSESSMENT:   1.  Left upper lobe pneumonia, community-acquired in a 89-year-old, some tobacco use.  No signs of sepsis.  Procalcitonin mild elevation.  Sats  are stable on room air.  ABG also normal.  Not bringing up much sputum at all.  We will try and get a sample if possible.  He has gotten Rocephin and azithromycin in the ER.  We will continue with this upstairs.  A COVID swab will be done.  I think he is low probability.   2.  Non-ST elevation myocardial infarction.  The patient presents with an EKG showing no acute changes.  Right bundle branch block is noted.  Troponin of 4.5.  I think this is more probably a demand ischemia.  I discussed with the patient and family do not wish for transfer for aggressive interventions.  I think it is appropriate given his age.  We will treat him with some Lovenox for 48 hours.  He has got an aspirin.  We will trend his troponins.  Does have elevated BNP, but on clinical exam, he has really no signs of heart failure, no edema, neck veins flat.  Lungs are clear.  We will obtain an echocardiogram tomorrow just to assess LV function to help us deal with any issues that might arise, but our goal is treat him with medical therapy.   3.  Chronic kidney disease, stage III:  Creatinine is up, but we do not have any idea what his baseline is.  He has had no signs of failure.  We will give him some IV fluids overnight.  Recheck this tomorrow.   4.  Elevated lactic acid 3.2.  After fluids we will repeat this.      CODE STATUS:  DNR/DNI after a long discussion with the family and patient.  Goal is to make him as comfortable as we can and treat what we can, but knowing his age of 89 there are always great risk in any illness that he does obtain.        Anticipated hospital stay will likely to be greater than or equal to 2 midnights.         TANNER CRONIN MD             D: 10/20/2020   T: 10/20/2020   MT: SYDNI      Name:     MEGAN PRIETO   MRN:      [id number]        Account:      [id number]   :      1917        Admitted:     10/20/2020                   Document: [id number]

## 2020-10-20 NOTE — ED NOTES
DATE:  10/20/2020   TIME OF RECEIPT FROM LAB:  8229  LAB TEST:  Lactic   LAB VALUE:  3.4  RESULTS GIVEN WITH READ-BACK TO (PROVIDER):  Booker Turk MD  TIME LAB VALUE REPORTED TO PROVIDER:   3130

## 2020-10-20 NOTE — ED NOTES
DATE:  10/20/2020   TIME OF RECEIPT FROM LAB:  1241  LAB TEST:  troponin  LAB VALUE:  4.521  RESULTS GIVEN WITH READ-BACK TO (PROVIDER):  Booker Turk MD  TIME LAB VALUE REPORTED TO PROVIDER:   1247

## 2020-10-20 NOTE — ED PROVIDER NOTES
History     Chief Complaint   Patient presents with     Generalized Weakness     HPI  Armand Hernandez is a 89 year old male who presented to the emergency department accompanied by the daughter.  Patient still lives at home alone and is able to do activities of daily living independently.  Daughter reports that he has been complaining of feeling weak and weak over the last few weeks.  He has had multiple falls onto his buttocks and is now complaining of bilateral hip pains.  He is also feeling weak and now requires support event to go to the bathroom.  Denies any headache, vomiting, visual changes.  He has a sensation of incomplete emptying of the bladder after urination  With recurrent episodes of nocturia.  No fever, chills, chest pain or shortness of breath.    Allergies:  No Known Allergies    Problem List:    Patient Active Problem List    Diagnosis Date Noted     Pneumonia 10/20/2020     Priority: Medium     NSTEMI (non-ST elevated myocardial infarction) (H) 10/20/2020     Priority: Medium     Pneumonia of left upper lobe due to infectious organism 10/20/2020     Priority: Medium     Proteinuria, unspecified type 10/20/2020     Priority: Medium     Cellulitis and abscess of trunk 09/19/2014     Priority: Medium        Past Medical History:    Past Medical History:   Diagnosis Date     AAA (abdominal aortic aneurysm) (H)      Arthritis      Thyroid disease        Past Surgical History:    Past Surgical History:   Procedure Laterality Date     AAA REPAIR       EYE SURGERY      cataract both eyes     HEAD & NECK SURGERY      abcess removal,     HERNIA REPAIR      hernia x 2     REPAIR ECTROPION BILATERAL  1/14/2014    Procedure: REPAIR ECTROPION BILATERAL;  BILATERAL REPAIR OF ECTROPION;  Surgeon: Titi Yo MD;  Location: [facility] OR       Family History:    No family history on file.    Social History:  Marital Status:   [5]  Social History     Tobacco Use     Smoking status: Current Every Day Smoker      Packs/day: 0.25     Types: Cigarettes     Smokeless tobacco: Never Used   Substance Use Topics     Alcohol use: Yes     Comment: rare     Drug use: No        Medications:    No current outpatient medications on file.      Review of Systems   Constitutional: Negative for fever.   Respiratory: Negative for shortness of breath.    Cardiovascular: Negative for chest pain.   Gastrointestinal: Negative for abdominal pain.   All other systems reviewed and are negative.      Physical Exam   BP: 129/63  Pulse: 64  Temp: 100.6  F (38.1  C)  Resp: 18  Weight: 65.5 kg (144 lb 8 oz)  SpO2: 98 %      Physical Exam  Vitals signs and nursing note reviewed.   Constitutional:       General: He is not in acute distress.     Appearance: He is well-developed. He is ill-appearing. He is not diaphoretic.      Comments: Week with dry mucous membranes   HENT:      Head: Normocephalic and atraumatic.      Mouth/Throat:      Mouth: Mucous membranes are dry.   Cardiovascular:      Rate and Rhythm: Normal rate and regular rhythm.      Heart sounds: Normal heart sounds.   Pulmonary:      Effort: Pulmonary effort is normal. No respiratory distress.      Breath sounds: Normal breath sounds. No stridor.   Abdominal:      General: Bowel sounds are normal. There is no distension.      Tenderness: There is no abdominal tenderness.   Musculoskeletal:         General: No tenderness or deformity.   Neurological:      General: No focal deficit present.      Mental Status: He is alert and oriented to person, place, and time.         ED Course   Patient evaluated and started on IV fluid hydration.  Labs ordered including chest x-ray and pelvic x-ray    Procedures       EKG Interpretation:      Interpreted by Booker Turk MD  Time reviewed: 12:27 PM  Symptoms at time of EKG: General weakness  Rhythm: normal sinus   Rate: normal  Axis: normal  Ectopy: none  Conduction: Right bundle branch block  ST Segments/ T Waves: No ST-T wave changes  Q Waves:  none  Comparison to prior: No old EKG available    Clinical Impression: Sinus rhythm with right bundle branch block, premature atrial complexes    Repeat EKG at 2:47 PM: Sinus rhythm with premature atrial complexes, right bundle branch block, Q waves in inferior leads.      Results for orders placed or performed during the hospital encounter of 10/20/20 (from the past 24 hour(s))   CBC with platelets differential   Result Value Ref Range    WBC 12.5 (H) 4.0 - 11.0 10e9/L    RBC Count 3.46 (L) 4.4 - 5.9 10e12/L    Hemoglobin 11.4 (L) 13.3 - 17.7 g/dL    Hematocrit 34.6 (L) 40.0 - 53.0 %     78 - 100 fl    MCH 32.9 26.5 - 33.0 pg    MCHC 32.9 31.5 - 36.5 g/dL    RDW 15.8 (H) 10.0 - 15.0 %    Platelet Count 417 150 - 450 10e9/L    Diff Method Automated Method     % Neutrophils 90.5 %    % Lymphocytes 2.6 %    % Monocytes 5.7 %    % Eosinophils 0.0 %    % Basophils 0.3 %    % Immature Granulocytes 0.9 %    Nucleated RBCs 0 0 /100    Absolute Neutrophil 11.3 (H) 1.6 - 8.3 10e9/L    Absolute Lymphocytes 0.3 (L) 0.8 - 5.3 10e9/L    Absolute Monocytes 0.7 0.0 - 1.3 10e9/L    Absolute Eosinophils 0.0 0.0 - 0.7 10e9/L    Absolute Basophils 0.0 0.0 - 0.2 10e9/L    Abs Immature Granulocytes 0.1 0 - 0.4 10e9/L    Absolute Nucleated RBC 0.0    Basic metabolic panel   Result Value Ref Range    Sodium 144 133 - 144 mmol/L    Potassium 3.7 3.4 - 5.3 mmol/L    Chloride 111 (H) 94 - 109 mmol/L    Carbon Dioxide 25 20 - 32 mmol/L    Anion Gap 8 3 - 14 mmol/L    Glucose 145 (H) 70 - 99 mg/dL    Urea Nitrogen 38 (H) 7 - 30 mg/dL    Creatinine 1.40 (H) 0.66 - 1.25 mg/dL    GFR Estimate 40 (L) >60 mL/min/[1.73_m2]    GFR Estimate If Black 46 (L) >60 mL/min/[1.73_m2]    Calcium 9.0 8.5 - 10.1 mg/dL   UA reflex to Microscopic and Culture    Specimen: Midstream Urine   Result Value Ref Range    Color Urine Yellow     Appearance Urine Slightly Cloudy     Glucose Urine 30 (A) NEG^Negative mg/dL    Bilirubin Urine Negative NEG^Negative     Ketones Urine Negative NEG^Negative mg/dL    Specific Gravity Urine 1.019 1.003 - 1.035    Blood Urine Moderate (A) NEG^Negative    pH Urine 5.5 4.7 - 8.0 pH    Protein Albumin Urine 100 (A) NEG^Negative mg/dL    Urobilinogen mg/dL Normal 0.0 - 2.0 mg/dL    Nitrite Urine Negative NEG^Negative    Leukocyte Esterase Urine Negative NEG^Negative    Source Midstream Urine     RBC Urine 4 (H) 0 - 2 /HPF    WBC Urine 6 (H) 0 - 5 /HPF    Bacteria Urine Few (A) NEG^Negative /HPF    Squamous Epithelial /HPF Urine 0 0 - 1 /HPF    Mucous Urine Present (A) NEG^Negative /LPF    Hyaline Casts 5 (A) OTO2^O - 2 /LPF    Granular Casts 14 (A) NEG^Negative /LPF    Amorphous Crystals Few (A) NEG^Negative /HPF   CRP inflammation   Result Value Ref Range    CRP Inflammation 148.0 (H) 0.0 - 8.0 mg/L   D dimer quantitative   Result Value Ref Range    D Dimer 1.8 (H) 0.0 - 0.50 ug/ml FEU   Lactic acid whole blood   Result Value Ref Range    Lactic Acid 3.4 (H) 0.7 - 2.0 mmol/L   Lipase   Result Value Ref Range    Lipase 68 (L) 73 - 393 U/L   Nt probnp inpatient (BNP)   Result Value Ref Range    N-Terminal Pro BNP Inpatient 18,780 (H) 0 - 1,800 pg/mL   Troponin I   Result Value Ref Range    Troponin I ES 4.521 (HH) 0.000 - 0.045 ug/L   Procalcitonin   Result Value Ref Range    Procalcitonin 0.25 ng/ml   CK total   Result Value Ref Range    CK Total 279 30 - 300 U/L   Blood gas arterial and oxyhgb   Result Value Ref Range    pH Arterial 7.43 7.35 - 7.45 pH    pCO2 Arterial 33 (L) 35 - 45 mm Hg    pO2 Arterial 70 (L) 80 - 105 mm Hg    Bicarbonate Arterial 22 21 - 28 mmol/L    FIO2 21     Oxyhemoglobin Arterial 93 92 - 100 %    Base Deficit Art 2.1 mmol/L   Nt probnp inpatient (BNP)   Result Value Ref Range    N-Terminal Pro BNP Inpatient 19,468 (H) 0 - 1,800 pg/mL   Blood culture    Specimen: Blood   Result Value Ref Range    Specimen Description Blood     Special Requests 2 PEDI BOTTLES DRAWN     Culture Micro PENDING    XR Pelvis and Hip  Bilateral 2 Views    Narrative    XR PELVIS AND HIP BILATERAL 2 VIEWS    HISTORY: Bilateral hip pains .    COMPARISON: None.    TECHNIQUE: 5 views of the pelvis.    FINDINGS:    No acute proximal femoral fracture is identified. There is mild to  moderate hip osteoarthritis bilaterally. Vascular calcifications are  seen. Bowel gas overlies the sacrum, limiting assessment. Moderate SI  joint degeneration is suggested. Advanced low lumbar degeneration is  seen.        Impression    IMPRESSION:     No evidence of acute fracture. Advanced lumbar degenerative changes.  Mild to moderate hip osteoarthritis bilaterally.      DARRYN DOTSON MD   CTA Chest with Contrast    Narrative    CTA CHEST WITH CONTRAST    HISTORY: 89 years Male PE suspected, high pretest prob    TECHNIQUE: Axial CT imaging of the chest was performed with  intervenous contrast during arterial phase of enhancement. Multiplanar  reconstructions and 3-D MIP reconstructions were obtained on a 3-D  workstation.    COMPARISON: None    FINDINGS:  There are no filling defects within the pulmonary arteries to suggest  pulmonary embolism. There is no evidence of thoracic aortic aneurysm  or dissection.  There is no mediastinal or hilar or axillary lymphadenopathy.    There is consolidating airspace opacity in the posterior, dependent  aspect of the left upper lobe and lingula. There is mild dependent  atelectasis bilaterally in the lower lobes.         No concerning osseous lesions are identified. There is osteopenia.      Impression    IMPRESSION: No evidence of pulmonary embolism.    Left upper lobe airspace opacity suggestive of pneumonia.    TANNER DOLAN MD   TSH   Result Value Ref Range    TSH 0.22 (L) 0.40 - 4.00 mU/L   Procalcitonin   Result Value Ref Range    Procalcitonin 0.40 ng/ml   Troponin I   Result Value Ref Range    Troponin I ES 5.786 (HH) 0.000 - 0.045 ug/L   Lactic acid whole blood   Result Value Ref Range    Lactic Acid 1.6 0.7 - 2.0  mmol/L       Medications   0.9% sodium chloride BOLUS (0 mLs Intravenous Stopped 10/20/20 1343)     Followed by   sodium chloride 0.9% infusion ( Intravenous ED Infusing on Admission/transfer 10/20/20 1637)   azithromycin (ZITHROMAX) 500 mg in sodium chloride 0.9 % 250 mL intermittent infusion (0 mg Intravenous ED Infusing on Admission/transfer 10/20/20 1636)   cefTRIAXone in d5w (ROCEPHIN) intermittent infusion 1 g (has no administration in time range)   levothyroxine (SYNTHROID/LEVOTHROID) tablet 125 mcg (has no administration in time range)   lidocaine 1 % 0.1-1 mL (has no administration in time range)   lidocaine (LMX4) kit (has no administration in time range)   sodium chloride (PF) 0.9% PF flush 3 mL (has no administration in time range)   sodium chloride (PF) 0.9% PF flush 3 mL (3 mLs Intracatheter Not Given 10/20/20 1902)   naloxone (NARCAN) injection 0.1-0.4 mg (has no administration in time range)   melatonin tablet 1 mg (has no administration in time range)   Patient is already receiving anticoagulation with heparin, enoxaparin (LOVENOX), warfarin (COUMADIN)  or other anticoagulant medication (has no administration in time range)   lactated ringers infusion ( Intravenous New Bag 10/20/20 1857)   acetaminophen (TYLENOL) tablet 650 mg (has no administration in time range)   ondansetron (ZOFRAN-ODT) ODT tab 4 mg (has no administration in time range)     Or   ondansetron (ZOFRAN) injection 4 mg (has no administration in time range)   aspirin EC tablet 81 mg (has no administration in time range)   enoxaparin ANTICOAGULANT (LOVENOX) injection 70 mg (70 mg Subcutaneous Given 10/20/20 1859)   aspirin (ASA) chewable tablet 324 mg (324 mg Oral Given 10/20/20 1302)   heparin ANTICOAGULANT Loading dose from infusion pump *Give when STARTING heparin infusion 3,950 Units (3,950 Units Intravenous Given 10/20/20 1317)   iopamidol (ISOVUE-370) solution 100 mL (100 mLs Intravenous Given 10/20/20 1415)   sodium chloride  (PF) 0.9% PF flush 100 mL (100 mLs Intravenous Given 10/20/20 1414)   cefTRIAXone in d5w (ROCEPHIN) intermittent infusion 1 g (0 g Intravenous Stopped 10/20/20 1636)       Assessments & Plan (with Medical Decision Making)   Pneumonia of left upper lobe: Confirmed on CTA chest.  Originally presented to the ED with generalized weakness.  Started on IV Rocephin and Zithromax.  Does not require oxygen support.  Will be admitted under care of Dr. Holt.  Non-STEMI: EKG showed sinus rhythm with right bundle branch block.  Troponin was elevated to 4.521.  Patient however did not have any chest pain or shortness of breath.  Held a discussion with patient and the son and they preferred medical management of the non-STEMI rather than transfer to [facility].  Patient is now DNR/DNI.  Started on heparin bolus and infusion together with aspirin at the ED.  We will continue heparin as inpatient.  Admitted under care of Dr. Holt.    I have reviewed the nursing notes.    I have reviewed the findings, diagnosis, plan and need for follow up with the patient.    Current Discharge Medication List          Final diagnoses:   Generalized muscle weakness   Pneumonia of left upper lobe due to infectious organism   NSTEMI (non-ST elevated myocardial infarction) (H)   Proteinuria, unspecified type       10/20/2020   [facility]     Booker Turk MD  10/20/20 5232

## 2020-10-21 NOTE — PLAN OF CARE
Reason for hospital stay:  Pneumonia / N-Stemi    Living situation PTA: Home alone independently    Most recent vitals: /68   Pulse 78   Temp 99.4  F (37.4  C) (Tympanic)   Resp 16   Wt 65.5 kg (144 lb 8 oz)   SpO2 94%   BMI 23.32 kg/m      Pain Management:  Denied any pain    LOC:  Alert - disoriented to time and situation. Hard of hearing - wears bilateral hearing aids. Hearing aids placed in  on nightstand at bedtime.     Cardiac:  AP regular    Respiratory:  Lungs diminished throughout - maintaining sats 94% on RA.     GI:  WDL - good appetite - ate all of supper.     :  WDL - uses urinal in bed with assistance.     IVF:  LR infusing at 100mL/hr - 500mL NS bolus infused per order    ABX:  Continues on IV Rocephin and IV Zithromax    Safety:  Alarms on    Face to face report given with opportunity to observe patient.    Report given to Mari SUNSHINE RN / Mireya MARSHALL RN    10/21/2020  12:09 AM  Aaron Thomas RN

## 2020-10-21 NOTE — PLAN OF CARE
Pt is a/o x3, required reorientation to time. Forgetfulness noted, was unsure of birth date month. Denies pain or SOB.  Infrequent congested cough. Lung sounds: RLL and RML coarse crackles audible, LLL expiratory wheezing. Febrile 99.9 and 101.7. Tylenol administered x1. Two episodes of incontinence. Troponin critical at 7.550, reported to provider.     Face to face report given with opportunity to observe patient.    Report given to Megan Chand RN   10/21/2020  7:34 AM

## 2020-10-21 NOTE — PLAN OF CARE
DATE:  10/21/2020   TIME OF RECEIPT FROM LAB:  1028  LAB TEST:  troponin  LAB VALUE:  19.865  RESULTS GIVEN WITH READ-BACK TO (PROVIDER):  Devan Holt MD  TIME LAB VALUE REPORTED TO PROVIDER:   1030

## 2020-10-21 NOTE — PROGRESS NOTES
[facility]    Hospitalist Progress Note      Assessment & Plan   Armand Hernandez is a 89 year old male who was admitted on 10/20/2020.      1.  Non-ST elevation myocardial infarction: Patient came in because just increasing weakness of the last couple of days.  Several falls.  Had a fever and evidence of left upper lobe pneumonia.  As part of his work-up he did have troponin performed which was elevated initially at 4.5.  Really did not complain of any chest pain except maybe some mild left-sided discomfort off and on.  No major shortness of breath no syncopal episodes at all.  EKG shows a right bundle branch block with some lateral ST changes.  No evidence of an acute ST elevation MI.  BNP was elevated.  However he showed no signs of heart failure neck veins flat no peripheral edema lungs clear normal room air sats.  Decision was made after discussion with family to take a very conservative route given his age 89 years old.  Did not want transfer for possible coronary angiography and intervention.  As he basically is asymptomatic with this.  He has been getting anticoagulation initially IV heparin and switched over to subcu Lovenox.  Plan is to continue this for at least 48 hours.  Did get aspirin dosing.  He has been given Brilinta now.  Have not given beta-blocker as blood pressures are around 100 systolic.  He continues to be completely asymptomatic.  An echo will be obtained little later today just to assess his LV function look for wall motion abnormalities.  His follow-up EKG does not show any major changes.  The ST-T changes out in the lateral precordial leads are improved.  So far clinically looks good.  His troponins continue to rise unfortunately up to 19.8 currently.  Our hope is they will level off at some point.  But given his age in the setting of an acute non-STEMI and pneumonia he is at high risk for potential complications.  But surprisingly continues to do well we will trend out his  troponins to at least the level of her start to drop down.  Consider adding beta-blocker with the next 24 hours if pressure stable.  Atorvastatin also started.    2.  Pneumonia: Chest x-ray has left upper lobe pneumonia.  Still some fevers.  Procalcitonin was not all that high.  Is on Rocephin and azithromycin.  Will continue same.  Does cough up some sputum he states but has not been saving it.    3.  Hypothyroidism on replacement.  Correct dose is 100 mcg which is no change.    4.  Anemia: Patient did get IV fluids.  Will IV lock currently.  His hemoglobin did drop down to 9.1.  Platelet count stable at 325,000.  No evidence of acute bleeding.  Will monitor closely.      Diet: Combination Diet Regular Diet Adult  Fluids: IV lock    DVT Prophylaxis: Enoxaparin (Lovenox) SQ  Code Status: No CPR- Do NOT Intubate    Disposition: Expected discharge in 2-5 days once cardiac status stable.    Devan Holt    Interval History   Patient this morning continues to be alert pleasant denies any chest pains or shortness of breath.  Still some cough.  Is hungry eating breakfast.  Blood pressures a bit soft 100-110 systolic range.  Sats stable on room air.  No obvious distress at all.  Troponin still continue to rise.  EKG with no acute further changes.  Treating him conservatively at this time.    -Data reviewed today: I reviewed all new labs and imaging results over the last 24 hours. I personally reviewed the EKG tracing showing No acute changes from last evening..    Physical Exam   Temp: 100  F (37.8  C) Temp src: Tympanic BP: (!) 110/44 Pulse: 68   Resp: 20 SpO2: 96 % O2 Device: None (Room air)    Vitals:    10/20/20 1252   Weight: 65.5 kg (144 lb 8 oz)     Vital Signs with Ranges  Temp:  [99.4  F (37.4  C)-102.1  F (38.9  C)] 100  F (37.8  C)  Pulse:  [] 68  Resp:  [16-24] 20  BP: (104-139)/(38-88) 110/44  SpO2:  [91 %-98 %] 96 %  I/O last 3 completed shifts:  In: 2566 [P.O.:480; I.V.:2086]  Out: 100  [Urine:100]    Peripheral IV 10/20/20 Right  (Active)   Site Assessment WDL 10/21/20 0949   Line Status Saline locked 10/21/20 0949   Phlebitis Scale 0-->no symptoms 10/21/20 0949   Infiltration Scale 0 10/21/20 0949   Number of days: 1       Peripheral IV 10/20/20 Left Lower forearm (Active)   Site Assessment WDL 10/21/20 0949   Line Status Saline locked 10/21/20 0949   Phlebitis Scale 0-->no symptoms 10/21/20 0949   Infiltration Scale 0 10/21/20 0949   Number of days: 1       Incision/Surgical Site 01/14/14 Bilateral;Lower Eye (Active)   Number of days: 2472     No line/device    Constitutional: Alert and oriented x3. No distress    HEENT: Normocephalic/atraumatic, PERRL, EOMI, mouth mist, neck supple, no LN.     Cardiovascular: RRR.  1 2/6 left sternal border murmur, no  rubs, or gallops.  JVD continue be flat.  Bruits no.  Pulses 2+    Respiratory: Actually rather clear.  Few crackles at that left base posteriorly clear to auscultation bilaterally    Abdomen: Soft, nontender, nondistended, no organomegaly. Bowel sounds present    Extremities: Warm/dry.  No edema    Neuro:   Non focal, cranial nerves intact, Moves all extremities.  Medications     - MEDICATION INSTRUCTIONS -         aspirin  81 mg Oral Daily     azithromycin  500 mg Intravenous Q24H     cefTRIAXone  1 g Intravenous Q24H     enoxaparin ANTICOAGULANT  1 mg/kg Subcutaneous Q24H     [START ON 10/22/2020] levothyroxine  100 mcg Oral Daily     sodium chloride (PF)  3 mL Intracatheter Q8H     ticagrelor  90 mg Oral BID       Data   Recent Labs   Lab 10/21/20  1003 10/21/20  0800 10/21/20  0516 10/21/20  0212 10/20/20  1204 10/20/20  1204 10/20/20  1107   WBC  --   --  9.9  --   --   --  12.5*   HGB  --   --  9.1*  --   --   --  11.4*   MCV  --   --  98  --   --   --  100   PLT  --   --  325  --   --   --  417   NA  --   --  143  --   --   --  144   POTASSIUM  --   --  3.7  --   --   --  3.7   CHLORIDE  --   --  114*  --   --   --  111*   CO2  --   --   22  --   --   --  25   BUN  --   --  41*  --   --   --  38*   CR  --   --  1.42*  --   --   --  1.40*   ANIONGAP  --   --  7  --   --   --  8   LUH  --   --  8.0*  --   --   --  9.0   GLC  --   --  122*  --   --   --  145*   ALBUMIN  --   --  2.4*  --   --   --   --    PROTTOTAL  --   --  6.0*  --   --   --   --    BILITOTAL  --   --  0.3  --   --   --   --    ALKPHOS  --   --  68  --   --   --   --    ALT  --   --  39  --   --   --   --    AST  --   --  79*  --   --   --   --    LIPASE  --   --   --   --   --  68*  --    TROPI 19.865* 12.640*  --  7.550*   < > 4.521*  --     < > = values in this interval not displayed.       Recent Results (from the past 24 hour(s))   XR Pelvis and Hip Bilateral 2 Views    Narrative    XR PELVIS AND HIP BILATERAL 2 VIEWS    HISTORY: Bilateral hip pains .    COMPARISON: None.    TECHNIQUE: 5 views of the pelvis.    FINDINGS:    No acute proximal femoral fracture is identified. There is mild to  moderate hip osteoarthritis bilaterally. Vascular calcifications are  seen. Bowel gas overlies the sacrum, limiting assessment. Moderate SI  joint degeneration is suggested. Advanced low lumbar degeneration is  seen.        Impression    IMPRESSION:     No evidence of acute fracture. Advanced lumbar degenerative changes.  Mild to moderate hip osteoarthritis bilaterally.      DARRYN DOTSON MD   CTA Chest with Contrast    Narrative    CTA CHEST WITH CONTRAST    HISTORY: 89 years Male PE suspected, high pretest prob    TECHNIQUE: Axial CT imaging of the chest was performed with  intervenous contrast during arterial phase of enhancement. Multiplanar  reconstructions and 3-D MIP reconstructions were obtained on a 3-D  workstation.    COMPARISON: None    FINDINGS:  There are no filling defects within the pulmonary arteries to suggest  pulmonary embolism. There is no evidence of thoracic aortic aneurysm  or dissection.  There is no mediastinal or hilar or axillary lymphadenopathy.    There is  consolidating airspace opacity in the posterior, dependent  aspect of the left upper lobe and lingula. There is mild dependent  atelectasis bilaterally in the lower lobes.         No concerning osseous lesions are identified. There is osteopenia.      Impression    IMPRESSION: No evidence of pulmonary embolism.    Left upper lobe airspace opacity suggestive of pneumonia.    TANNER DOLAN MD

## 2020-10-21 NOTE — PROGRESS NOTES
Assessment completed by with Armand and son Say.    LOC: alert     Dx: PNA, NSTEMI  Chronic Disease Management: thyroid disease    Lives with: alone  Living at:  [place]  Transportation: YES, family provides    Primary PCP: [facility]  Insurance:  Medicare and [employer]  Medicare: Inpatient letter reviewed with Armand and Say.    Support System:  family  Homecare/PCA: no  /County Services:   no  [place]: YES      How was the [facility] notification completed: yes, 10/21/2020 @ 1340    Health Care Directive: son believes he has one  Guardian: no  POA: daughter has the document    Pharmacy: [facility]  Meds management: manages own with help from family    Adequate Resources for needs (housing, utilities, food/med): YES  Household chores: family  Work/community/social activity: YES, as desired    ADLs: independent  Ambulation:used a cane, just started using a walker recently  Falls: multiple recently  Nutrition: no concern  Sleep: sleeps well    Equipment used: walker      Oxygen supplier: no      Does the supplier have valid oxygen orders: n/a    Mental health: denies  Substance abuse: denies  Exposure to violence/abuse: denies  Stressors: denies    Able to Return to Prior Living Arrangements: unknown at this time    Choice of Vendor: n/a    Barriers: lives alone, multiple falls    REZA: low    Plan: unknown at this time.

## 2020-10-21 NOTE — PROVIDER NOTIFICATION
DATE:  10/20/2020   TIME OF RECEIPT FROM LAB:  2216  LAB TEST:  Troponin  LAB VALUE:  6.712  RESULTS GIVEN WITH READ-BACK TO (PROVIDER): Dr. Michel    TIME LAB VALUE REPORTED TO PROVIDER:   1172

## 2020-10-21 NOTE — PLAN OF CARE
DATE:  10/21/2020   TIME OF RECEIPT FROM LAB:  5570   LAB TEST:  Troponin  LAB VALUE:  26.439  RESULTS GIVEN WITH READ-BACK TO (PROVIDER):  Devan Holt MD  TIME LAB VALUE REPORTED TO PROVIDER:   4176

## 2020-10-21 NOTE — PROVIDER NOTIFICATION
Patient with increasing Temp 102.1, skin hot and flushed. Gave 650mg Tylenol - cool wash cloth applied and extra blankets removed. Encouraged fluids. Updated Dr. Michel at approximately 7:30 PM - new orders received.

## 2020-10-21 NOTE — PLAN OF CARE
DATE:  10/21/2020   TIME OF RECEIPT FROM LAB:  0830  LAB TEST:  troponin  LAB VALUE:  12.640  RESULTS GIVEN WITH READ-BACK TO (PROVIDER):  Devan Holt MD  TIME LAB VALUE REPORTED TO PROVIDER:   0868

## 2020-10-21 NOTE — PROVIDER NOTIFICATION
DATE:  10/21/2020   TIME OF RECEIPT FROM LAB:  0252  LAB TEST:  Troponin  LAB VALUE:  7.550  RESULTS GIVEN WITH READ-BACK TO PROVIDER:  Dr. Michel  TIME LAB VALUE REPORTED TO PROVIDER:   0254

## 2020-10-21 NOTE — PLAN OF CARE
Troponins continue to rise throughout the shift; last troponin was 26.439.  Patient denies any chest pain.  Son, daughter, and granddaughter were here today and have been updated on care.  Echo completed.  Continues on IV ABX for pneumonia.  LS are diminished with crackling throughout.  Two IV's SL'd.  Up into chair for breakfast with assist of 2.  Ate well but declined lunch until 1500.  Voids frequent small amounts in bedside urinal.  Bed alarm on for safety and urinal within reach.   Face to face report given with opportunity to observe patient.    Report given to ALLAN Guzman RN   10/21/2020  3:50 PM

## 2020-10-21 NOTE — PLAN OF CARE
Spoke with [facility] to verify Synthroid dose (125 mcg ordered, pt takes 100 mcg PTA) and confirmed with daughter. Notified MD of change. Daughter also stated pt takes 2 OTC products, a castor oil tablet and another Vitamin, she is unsure of strength but stated he would most likely know. Pt unable to hear me on the phone so I will wait until the nurse can call me back to relay information to and from him and then will update his medlist at that time. No concerns.     Coral Olivares on 10/21/2020 at 10:34 AM

## 2020-10-22 PROBLEM — I27.20 PULMONARY HYPERTENSION (H): Status: ACTIVE | Noted: 2020-01-01

## 2020-10-22 PROBLEM — I50.41 ACUTE COMBINED SYSTOLIC AND DIASTOLIC CONGESTIVE HEART FAILURE (H): Status: ACTIVE | Noted: 2020-01-01

## 2020-10-22 PROBLEM — D64.9 ANEMIA: Status: ACTIVE | Noted: 2020-01-01

## 2020-10-22 NOTE — PROGRESS NOTES
[facility]    Hospitalist Progress Note    Date of Service (when I saw the patient): 10/22/2020    Assessment & Plan       NSTEMI (non-ST elevated myocardial infarction) (H): Presenting symptom LE weakness and fall at home. Denies chest pain, dyspnea. Has noted a cough for the last several days prior to presentation. His troponin on arrival was 4.98, today up to 29 but seems to be hitting plateau as previous value yesterday was 28. He declined transfer to higher level of care for cardiac investigation or intervention. He has continued to deny dyspnea or chest pain since admission. He completed 48 hours full anticoagulation and was started on Brilinta after completion.   -10/22: Nursing staff noted visible dyspnea though patient denies as well as worsening crackles to bilateral lungs. He was given single dose IV lasix, had some diuresis though exact amounts unknown due to incontinence. Since he denies dyspnea in the first place no change in symptoms. He is breathing comfortably on my assessment.         Pneumonia of left upper lobe due to infectious organism: Noted on CT scan. Once asked patient confirmes cough over previous several days. Isolated left upper lobe is unusual. Repeat CXR this morning shows no change from CT scan. He has minimal elevation in Procal, persistent fever, normal WBC. He was started on rocephin and zithromax on arrival. Will continue for complete course. Covid swab was negative, however will retest with the persistent fever, cough x several days and weakness.     Possible aspiration: At bedside patient is having difficulty with wet cough after thin liquids, speech therapy consult placed for formal evaluation. He acknowledges that he is choking on thin liquids, says  This only happens rarely at home normally. Could be just due to overall weakness and attempting to take in oral while laying in bed.       Anemia: Unknown chronicity, stable considering IVF administration on arrival.  Will follow.       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 He has no known previous history of CHF. He denies any orthopnea, has no hypoxia or chest pain. He was given lasix last night but bumped renal panel a bit this morning. BNP quite a bit more elevated now at 45K compared to ED value of 19K. He has no significant peripheral edema at this time. Will not give any further lasix dosing today, re-evaluate in the AM along with renal panel.        Pulmonary hypertension (H): Moderate on ECHO, no dyspnea, no hypoxia at this time.       DVT Prophylaxis: Enoxaparin (Lovenox) SQ  Code Status: No CPR- Do NOT Intubate    Disposition: Expected discharge in 1-3 days once cardiac function stable.    Kinjal Benz,  CNP    Interval History   Feels well, hungry and thirsty. Denies chest pain, dyspnea, abdominal pain or nausea. Feels a little less weak today.    -Data reviewed today: I reviewed all new labs and imaging results over the last 24 hours. I personally reviewed no images or EKG's today.    Physical Exam   Temp: 100.5  F (38.1  C) Temp src: Tympanic BP: 121/58 Pulse: 81   Resp: 20 SpO2: 97 % O2 Device: None (Room air)    Vitals:    10/20/20 1252 10/22/20 0557   Weight: 65.5 kg (144 lb 8 oz) 65.4 kg (144 lb 2.9 oz)     Vital Signs with Ranges  Temp:  [99.9  F (37.7  C)-100.7  F (38.2  C)] 100.5  F (38.1  C)  Pulse:  [74-94] 81  Resp:  [19-22] 20  BP: (110-134)/(45-64) 121/58  SpO2:  [92 %-97 %] 97 %  I/O last 3 completed shifts:  In: -   Out: 675 [Urine:675]    Peripheral IV 10/20/20 Right  (Active)   Site Assessment WDL 10/22/20 1554   Line Status Saline locked 10/22/20 1554   Phlebitis Scale 0-->no symptoms 10/22/20 1554   Infiltration Scale 0 10/22/20 3173   Number of days: 2       Peripheral IV 10/20/20 Left Lower forearm (Active)   Site Assessment WDL 10/22/20 8208   Line  Status Infusing 10/22/20 1554   Phlebitis Scale 0-->no symptoms 10/22/20 1554   Infiltration Scale 0 10/22/20 1554   Number of days: 2       Incision/Surgical Site 01/14/14 Bilateral;Lower Eye (Active)   Number of days: 2473     Line/device assessment(s) completed for medical necessity    Constitutional: No acute distress  Respiratory: No visible dyspnea, equal chest rise and fall, audible upper airway rhonchi that partially clear with cough. Some fine crackles at the bases, right LL diminished.   Cardiovascular: HRR, no murmurs, rubs,thrills. No peripheral edema.   GI: Soft,nontender, bowel sounds are positive.  Skin/Integumen: No rashes, open areas. He does have widespread bruising bilateral arms that he and son state is from his fall several days ago.         Medications     - MEDICATION INSTRUCTIONS -         aspirin  81 mg Oral Daily     atorvastatin  80 mg Oral Daily     azithromycin  500 mg Intravenous Q24H     cefTRIAXone  1 g Intravenous Q24H     levothyroxine  100 mcg Oral Daily     sodium chloride (PF)  3 mL Intracatheter Q8H     ticagrelor  90 mg Oral BID       Data   Recent Labs   Lab 10/22/20  0516 10/21/20  1358 10/21/20  1003 10/21/20  0516 10/21/20  0516 10/20/20  1204 10/20/20  1204 10/20/20  1107   WBC 8.8  --   --   --  9.9  --   --  12.5*   HGB 9.0*  --   --   --  9.1*  --   --  11.4*   MCV 99  --   --   --  98  --   --  100     --   --   --  325  --   --  417     --   --   --  143  --   --  144   POTASSIUM 3.2*  --   --   --  3.7  --   --  3.7   CHLORIDE 113*  --   --   --  114*  --   --  111*   CO2 25  --   --   --  22  --   --  25   BUN 50*  --   --   --  41*  --   --  38*   CR 1.65*  --   --   --  1.42*  --   --  1.40*   ANIONGAP 6  --   --   --  7  --   --  8   LUH 8.0*  --   --   --  8.0*  --   --  9.0   *  --   --   --  122*  --   --  145*   ALBUMIN 2.1*  --   --   --  2.4*  --   --   --    PROTTOTAL 5.8*  --   --   --  6.0*  --   --   --    BILITOTAL 0.4  --   --   --   0.3  --   --   --    ALKPHOS 70  --   --   --  68  --   --   --    ALT 52  --   --   --  39  --   --   --    *  --   --   --  79*  --   --   --    LIPASE  --   --   --   --   --   --  68*  --    TROPI 28.999* 26.439* 19.865*   < >  --    < > 4.521*  --     < > = values in this interval not displayed.       Recent Results (from the past 24 hour(s))   XR Chest 2 Views    Narrative    PROCEDURE:  XR CHEST 2 VW    HISTORY:  follow up on pneumonia.     COMPARISON:  None.    FINDINGS:   The cardiac silhouette is normal in size. The pulmonary vasculature is  normal.  There is some abnormal increase in density seen in the left  upper lobe suspicious for pneumonia. This appears similar to a recent  CT scan. Some minimal right basilar opacity is noted. No pleural  effusion or pneumothorax.      Impression    IMPRESSION:  Allowing for differences in technique there has been no  significant changes compared to a CT scan performed October 20, 2020      GABRIELA BELCHER MD

## 2020-10-22 NOTE — PROGRESS NOTES
Checked in with Luis.  Denies questions or concerns at this time.  CTS will continue to remain available for support and resources.    Emeli Piper CM

## 2020-10-22 NOTE — PLAN OF CARE
"Patient has denied pain this shift. He has denied difficulty breathing but breathing has become labored as shift progressed. MD was updated. Roxanol SL given for labored breathing. Patient was awake and restless per daughter until the Roxanol was given, he was able to sleep after that. Daughter reported patient was \"less twitchy.\" Patient's speech is somewhat garbled, when I asked daughter if this was normal she stated that he hasn't slept. VSS with the exception of low grade temps, last temp check 100.2. Audible congestion began shortly after breathing pattern changed, has continued until the end of the shift. Lung sounds had fine crackles throughout at the beginning of the shift but became more coarse crackles as the shift progressed. Cough is fair, has become congested as shift progressed. Lasix IV given. Oral intake is poor, patient has only taken sips of liquids. Daughter did report she thought when he drank water \"it went down the wrong pipe.\" Patient swallowed sip of water and then was able to take one pill without difficulty for me. Encouraged daughter to ensure patient is sitting straight up when he's drinking or eating. Patient was voiding frequent small amounts the first half of the shift. Bladder scan revealed 67 ml. Patient became incontinent of urine after Lasix IV was given. Hands and feet have been cool, otherwise skin has been warm to hot. Turn Q2H. Daughter is present, she is spending the night because patient asked that she stay. Alarm is not on because daughter is present. Will encourage night shift to place alarms when daughter goes to sleep. Call light within reach. IV SL x 2. IV antibiotics continue. Lovenox and Brilinta continue.    Face to face report given with opportunity to observe patient.    Report given to Mireya LEMUS and Mari LEMUS.    Megan Yip RN   10/21/2020  11:05 PM      "

## 2020-10-22 NOTE — PLAN OF CARE
DATE:  10/22/2020   TIME OF RECEIPT FROM LAB:  0735  LAB TEST:  Trop  LAB VALUE:  28.999  RESULTS GIVEN WITH READ-BACK TO (PROVIDER):  Kinjal HERRON NP  TIME LAB VALUE REPORTED TO PROVIDER:   0736

## 2020-10-22 NOTE — PLAN OF CARE
Most recent vitals: /60   Pulse 93   Temp 100.1  F (37.8  C) (Tympanic)   Resp 19   Wt 65.4 kg (144 lb 2.9 oz)   SpO2 94%   BMI 23.27 kg/m      Pain Management:  Denies pain.   LOC:  A&O x 4. Pleasant. Some forgetfulness.   Cardiac:  Denies chest pain. HR 80-90s.   Respiratory:  Coarse crackles throughout lung field at beginning of shift. Audible congestion noted. Patient did have some SOB and breathlessness. Difficulty speaking in full sentences without taking a breath. Did give Roxanol x 1 for air hunger with some noticeable improvement. LS more clear as shift progressed. Encouraging deep breathing and coughing. Pink tinged sputum noted.   GI:  BS active x 4. Denies nausea, vomiting.   : Adequate output. Voiding small amounts, about 100 each time. Yellow, Straw colored.   Skin Issues:  Scattered bruises. Blanchable redness to coccyx, buttock. Repositioned every 2 hours.     IVF:  SL  ABX:  Azithromycin, Rocephin.     Nutrition: Combination Diet  Ambulation:Assist x 2. GB and walker. Did ambulate to the bathroom x 1 this shift and has been getting up to commode. Denied SOB while ambulating.   Safety:  Call light in reach, able to make needs known.     Comments: One time dose of K+ given this shift.K+ was 3.2.       10/22/2020  12:27 PM  Julianna Coates RN    Face to face report given with opportunity to observe patient.    Report given to ALLAN Walker.     Julianna Coates RN   10/22/2020  3:00 PM

## 2020-10-22 NOTE — PLAN OF CARE
Pt a/o,continues to be forgetful of birthday month, needed reorientation to time when waking at night. Denies pain. Roxanol given x1 d/t air hunger. Family present and requests turn/repo/change q3h. Incontinent x2. Ambulated to bathroom w/ assist of 2.     Face to face report given with opportunity to observe patient.    Report given to Julianna Chand RN   10/22/2020  7:34 AM

## 2020-10-23 NOTE — PROVIDER NOTIFICATION
Patient very restless, attempting to get up very often, very confused and requiring a sitter at the bedside, Dr. Michel notified.

## 2020-10-23 NOTE — PHARMACY-MEDICATION REGIMEN REVIEW
[facility]    Pharmacy      Antimicrobial Stewardship Note     Current antimicrobial therapy:  Anti-infectives (From now, onward)    Start     Dose/Rate Route Frequency Ordered Stop    10/21/20 1530  cefTRIAXone in d5w (ROCEPHIN) intermittent infusion 1 g      1 g  over 30 Minutes Intravenous EVERY 24 HOURS 10/20/20 1728      10/20/20 1445  azithromycin (ZITHROMAX) 500 mg in sodium chloride 0.9 % 250 mL intermittent infusion      500 mg  275 mL/hr over 1 Hours Intravenous EVERY 24 HOURS 10/20/20 1441            Indication: CAP    Days of Therapy: 4     Pertinent labs:  Creatinine   Creatinine   Date Value Ref Range Status   10/23/2020 1.60 (H) 0.66 - 1.25 mg/dL Final   10/22/2020 1.65 (H) 0.66 - 1.25 mg/dL Final   10/21/2020 1.42 (H) 0.66 - 1.25 mg/dL Final     WBC   WBC   Date Value Ref Range Status   10/23/2020 8.4 4.0 - 11.0 10e9/L Final   10/22/2020 8.8 4.0 - 11.0 10e9/L Final   10/21/2020 9.9 4.0 - 11.0 10e9/L Final     Procalcitonin   Procalcitonin   Date Value Ref Range Status   10/22/2020 0.80 ng/ml Final     Comment:     0.50-1.99 ng/ml  Moderate risk of systemic infection.  Recommendation:   Recommend antibiotics. Evaluate culture results and clinical features to   target antibacterial therapy. Obtain blood cultures and other relevant   cultures if not done.  If empiric antibiotics were started, recheck PCT in: 2 days to guide   antibiotic de-escalation.  Discontinue or de-escalate antibiotics when PCT   concentration is <80% of peak or abs PCT <0.5. If empiric antibiotics were NOT   started, recheck PCT in 6-24 hours to re-evaluate need for antibiotics.     10/20/2020 0.40 ng/ml Final     Comment:     0.25-0.49 ng/ml  Possible early systemic infection or localized infection.     Recommendation: Encourage antibiotics only in the correct clinical context.   Consider obtaining blood cultures or other relevant cultures. Recheck PCT in   6-12 hours to ensure baseline low level. If repeat PCT is  rising, consider   early systemic infection and consider starting antibiotics.     10/20/2020 0.25 ng/ml Final     Comment:     0.25-0.49 ng/ml  Possible early systemic infection or localized infection.     Recommendation: Encourage antibiotics only in the correct clinical context.   Consider obtaining blood cultures or other relevant cultures. Recheck PCT in   6-12 hours to ensure baseline low level. If repeat PCT is rising, consider   early systemic infection and consider starting antibiotics.       CRP   CRP Inflammation   Date Value Ref Range Status   10/20/2020 148.0 (H) 0.0 - 8.0 mg/L Final       Culture Results:   (10/21/20) Sputum  (10/21/20) Sputum gram stain = few budding yeast  (10/20/20) Blood  (10/21/20) COVID     Recommendations/Interventions:  1. Trend procalcitonin  2. Transition to oral when able    Jose Antonio Strong, [facility]  October 23, 2020

## 2020-10-23 NOTE — PROGRESS NOTES
10/23/20 0800   General Information   Onset of Illness/Injury or Date of Surgery 10/21/20   Referring Physician Latosha Benz   Patient/Family Therapy Goal Statement (SLP) Patient did not verbalize a goal   Pertinent History of Current Problem Patient is a 89 year old male who came in with myocardio infarction. Patient has some weakness and was observed aspirating.    Past History of Dysphagia Patient reports that he sometimes aspirates on liquids at home.    Disability/Function   Hearing Difficulty or Deaf yes   Patient's preferred means of communication verbal   Describe hearing loss bilateral hearing loss   Use of hearing assistive devices bilateral hearing aids   Wear Glasses or Blind yes   Concentrating, Remembering or Making Decisions Difficulty yes   Concentration Management yes   Difficulty Communicating yes   Communication difficulty understanding   Difficulty Eating/Swallowing yes   Eating/Swallowing swallowing liquids;swallowing solid food   Type of Evaluation   Type of Evaluation Swallow Evaluation   Oral Motor   Oral Musculature generally intact   Structural Abnormalities none present   Mucosal Quality good   Dentition (Oral Motor)   Dentition (Oral Motor) adequate dentition   Facial Symmetry (Oral Motor)   Facial Symmetry (Oral Motor) WNL   Lip Function (Oral Motor)   Lip Range of Motion (Oral Motor) WNL   Tongue Function (Oral Motor)   Tongue ROM (Oral Motor) WNL   Jaw Function (Oral Motor)   Jaw Function (Oral Motor) WNL   Cough/Swallow/Gag Reflex (Oral Motor)   Soft Palate/Velum (Oral Motor) WNL   Vocal Quality/Secretion Management (Oral Motor)   Vocal Quality (Oral Motor) WNL   General Swallowing Observations   Current Diet/Method of Nutritional Intake (General Swallowing Observations, NIS) thin liquids;regular diet   Respiratory Support (General Swallowing Observations) none   Swallowing Evaluation Clinical swallow evaluation   Clinical Swallow Evaluation   Feeding Assistance minimal  assistance required   Additional evaluation(s) completed today No   Clinical Swallow Evaluation Textures Trialed Thin Liquids;Nectar-Thick Liquids;Puree Textures;Semi-Solid   Clinical Swallow Eval: Thin Liquid Texture Trial   Mode of Presentation, Thin Liquids cup   Volume of Liquid or Food Presented 2 oz   Oral Phase of Swallow Premature pharyngeal entry   Pharyngeal Phase of Swallow coughing/choking;wet vocal quality after swallow   Clinical Swallow Evaluation: Nectar-Thick Liquid Texture Trial   Mode of Presentation, Nectar cup   Volume of Nectar Presented 4 oz   Oral Phase, [place]ck WFL   Pharyngeal Phase, Nectar intact   Clinical Swallow Evaluation: Puree Solid Texture Trial   Mode of Presentation, Puree spoon   Volume of Puree Presented 1 bite   Oral Phase, Puree WFL   Pharyngeal Phase, Puree intact   Clinical Swallow Evaluation: Semisolid Texture Trial   Mode of Presentation, Semisolid self-fed   Volume of Semisolid Food Presented 1 bite   Oral Phase, Semisolid WFL   Pharyngeal Phase, Semisolid intact   Esophageal Phase of Swallow   Patient reports or presents with symptoms of esophageal dysphagia Yes   Esophageal comments It was noted during evaluation that patient did a lot of burping which is indicative of reflux.    Swallowing Recommendations   Diet Consistency Recommendations nectar-thick liquids;dysphagia level 2 (mechanically altered)   Supervision Level for Intake close supervision needed   Mode of Delivery Recommendations bolus size, small;no straws;slow rate of intake   Postural Recommendations none   Swallowing Maneuver Recommendations alternate food and liquid intake   Monitoring/Assistance Required (Eating/Swallowing) check mouth frequently for oral residue/pocketing;stop eating activities when fatigue is present;monitor for cough or change in vocal quality with intake   Recommended Feeding/Eating Techniques (Swallow Eval) maintain upright sitting position for eating;maintain upright posture  during/after eating for 30 minutes;minimize distractions during oral intake;provide assist with feeding   Medication Administration Recommendations, Swallowing (SLP) take pills with nectar thick liquids or pureed textures.    Comment, Swallowing Recommendations Patient demonstrated immediate cough on thin liquids. Patient was much more successful on nectar thick liquids.    General Therapy Interventions   Planned Therapy Interventions Dysphagia Treatment   Dysphagia treatment Modified diet education;Instruction of safe swallow strategies;Compensatory strategies for swallowing   SLP Therapy Assessment/Plan   Criteria for Skilled Therapeutic Interventions Met (SLP Eval) yes   SLP Diagnosis oral pharyngeal dysphagia    Rehab Potential (SLP Eval) fair, will monitor progress closely   Therapy Frequency (SLP Eval) 3 times/wk   Predicted Duration of Therapy Intervention (SLP Eval) 1 week   Activity Limitations Related to Problem List (SLP) fatigues easily    Therapy Plan Review/Discharge Plan (SLP)   Therapy Plan Review (SLP) evaluation/treatment results reviewed   SLP Discharge Planning    SLP Discharge Recommendation (DC Rec) home with assist   SLP Rationale for DC Rec Patient will need assistance in understanding of the reasoning for thickening liquids.    SLP Brief overview of current status  Patient is overall weak and will need to be on thickened liquids until he is strong enough to tolerate thin liquids. He can tolerate mechancial soft textures.     Total Evaluation Time   Total Evaluation Time (Minutes) 25   Coping Strategies   Trust Relationship/Rapport care explained;choices provided;reassurance provided

## 2020-10-23 NOTE — PLAN OF CARE
Pt a/o x3, requires reorientation to time. Denies pain or SOB. VSS w/ low grade temp. Received roxanol x1.     Face to face report given with opportunity to observe patient.    Report given to ALLAN Guerra RN   10/23/2020  7:09 AM

## 2020-10-23 NOTE — PLAN OF CARE
Patient is alert and disoriented to situation, place and time. Very restless this shift and attempting to get up without help often, contacted Dr. Michel about increasing confusion and restlessness and PRN Zyprexa was ordered and given x 1. Patient calmed down afterwards and appears more calm, attempting to get up less, relaxing in bed at this time. Patient voiding frequently, only 50 cc out most of the time, bladder scan showed 123cc post void, patient was then able to void 100 cc so no follow up done at this time. IV antibiotics given as ordered. VSS, afebrile, lung sounds coarse. Patient does not have a bedside attendant at this time but is being watched closely by staff.    Face to face report given with opportunity to observe patient.    Report given to ALLAN Lane RN   10/23/2020  9:00 PM

## 2020-10-23 NOTE — PLAN OF CARE
Face to face report given with opportunity to observe patient.    Report given to Jimmy Keenan RN   10/23/2020  1:47 PM

## 2020-10-23 NOTE — PLAN OF CARE
VSS. Lung sounds course with crackles, non-productive congested cough, dyspneic on exertion. Alert with some confusion noted, increased confusion noted with room change. HR irregular et distant. Bedside attendant at start of shift, with room change within eye view et alarms audible et active.

## 2020-10-23 NOTE — PLAN OF CARE
Reason for hospital stay:  Pneumonia / Possible Covid    Most recent vitals: /63   Pulse 63   Temp 98.6  F (37  C) (Tympanic)   Resp 18   Wt 65.4 kg (144 lb 2.9 oz)   SpO2 96%   BMI 23.27 kg/m      Pain Management:  Denied any pain this shift    LOC:  Alert - oriented only to self - forgetful and doesn't use call light. Impulsive at times.     Cardiac:  AP irregular    Respiratory:  Lungs diminished throughout. Occasional congested cough. Maintaining sats 96% on RA.     GI:  WDL    :  Continent of urine - voids in urinal or in commode.     Skin Issues:  Scattered bruising, Blanchable red coccyx and sacrum. Placed air cushion in recliner since patient prefers to be in recliner most of shift. Repositioned every 2 hours.     IVF:  SL    ABX:  Continues on IV Zithromax and IV Rocephin    Activity: Assist of 1-2 with gait belt and walker for pivot transfers.      Safety:  Alarms on - daughter at bedside this shift.     Comments: Moved to negative airflow room (3208) per symptomatic covid rule out precautions.     Face to face report given with opportunity to observe patient.    Report given to Mari SUNSHINE RN / Mireya Thomas RN   10/22/2020  11:27 PM

## 2020-10-23 NOTE — PROVIDER NOTIFICATION
DATE:  10/23/2020   TIME OF RECEIPT FROM LAB:  0632  LAB TEST:  Anthony  LAB VALUE:  14.704  RESULTS GIVEN WITH READ-BACK TO (PROVIDER):  Kinjal Benz NP  TIME LAB VALUE REPORTED TO PROVIDER:   0632

## 2020-10-24 NOTE — PROGRESS NOTES
[facility]    Hospitalist Progress Note    Date of Service (when I saw the patient): 10/23/2020    Assessment & Plan       NSTEMI (non-ST elevated myocardial infarction) (H): Presenting symptom LE weakness and fall at home. Denies chest pain, dyspnea. Has noted a cough for the last several days prior to presentation. His troponin on arrival was 4.98, today up to 29 but seems to be hitting plateau as previous value yesterday was 28. He declined transfer to higher level of care for cardiac investigation or intervention. He has continued to deny dyspnea or chest pain since admission. He completed 48 hours full anticoagulation and was started on Brilinta after completion.   -10/22: Nursing staff noted visible dyspnea though patient denies as well as worsening crackles to bilateral lungs. He was given single dose IV lasix, had some diuresis though exact amounts unknown due to incontinence. Since he denies dyspnea in the first place no change in symptoms. He is breathing comfortably on my assessment.     -10/23: No hypoxia, wet cough continues-likely due to aspiration. Bases are clear, no peripheral edema. Will hold off on Lasix again today today as he has no symptoms and recheck Xray in the AM.       Pneumonia of left upper lobe due to infectious organism: Noted on CT scan. Once asked patient confirmes cough over previous several days. Isolated left upper lobe is unusual. Repeat CXR this morning shows no change from CT scan. He has minimal elevation in Procal, persistent fever, normal WBC. He was started on rocephin and zithromax on arrival. Will continue for complete course. Covid swab was negative, however will retest with the persistent fever, cough x several days and weakness.   -10/23: Fever curve improving, no hypoxia. Still wet loose cough. Recheck procal in AM.     Possible aspiration: At bedside patient is having difficulty with wet cough after thin liquids, speech therapy consult placed for formal  evaluation. He acknowledges that he is choking on thin liquids, says  This only happens rarely at home normally. Could be just due to overall weakness and attempting to take in oral while laying in bed.   -10/23: Speech evaluation did reveal signs of aspiration with clear liquids. Recommend nectar thick liquids with mechanical soft diet. Suspect he will be unwilling/unable to continue this modified texture at home, however the hope is he will continue to gain strength and be safer with thin liquid swallow.       Anemia: Unknown chronicity, stable considering IVF administration on arrival. Will follow.       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 He has no known previous history of CHF. He denies any orthopnea, has no hypoxia or chest pain. He was given lasix last night but bumped renal panel a bit this morning. BNP quite a bit more elevated now at 45K compared to ED value of 19K. He has no significant peripheral edema at this time. Will not give any further lasix dosing today, re-evaluate in the AM along with renal panel.   10/23: Hold of on lasix today, repeat cxr in AM.        Pulmonary hypertension (H): Moderate on ECHO, no dyspnea, no hypoxia at this time.       DVT Prophylaxis: Enoxaparin (Lovenox) SQ  Code Status: No CPR- Do NOT Intubate    Disposition: Expected discharge in 1-3 days once cardiac function stable.    Kinjal Benz,  CNP    Interval History   Feels well, craving  fluids less today. Denies chest pain, dyspnea, abdominal pain or nausea. Feels a little less weak today.    -Data reviewed today: I reviewed all new labs and imaging results over the last 24 hours. I personally reviewed no images or EKG's today.    Physical Exam   Temp: 97.5  F (36.4  C) Temp src: Tympanic BP: 120/71 Pulse: 82   Resp: 22 SpO2: 92 % O2 Device: None (Room air)    Vitals:    10/20/20  1252 10/22/20 0557 10/23/20 0521   Weight: 65.5 kg (144 lb 8 oz) 65.4 kg (144 lb 2.9 oz) 64.9 kg (143 lb 1.3 oz)     Vital Signs with Ranges  Temp:  [97.5  F (36.4  C)-99.7  F (37.6  C)] 97.5  F (36.4  C)  Pulse:  [63-96] 82  Resp:  [18-22] 22  BP: (102-121)/(57-71) 120/71  SpO2:  [91 %-99 %] 92 %  I/O last 3 completed shifts:  In: 240 [P.O.:240]  Out: 975 [Urine:975]    Peripheral IV 10/20/20 Right  (Active)   Site Assessment [facility] 10/23/20 1643   Line Status Saline locked 10/23/20 1643   Phlebitis Scale 0-->no symptoms 10/23/20 1643   Infiltration Scale 0 10/23/20 1643   Number of days: 3       Peripheral IV 10/20/20 Left Lower forearm (Active)   Site Assessment [facility] 10/23/20 1643   Line Status Infusing 10/23/20 1643   Phlebitis Scale 0-->no symptoms 10/23/20 1643   Infiltration Scale 0 10/23/20 1643   Number of days: 3       Incision/Surgical Site 01/14/14 Bilateral;Lower Eye (Active)   Number of days: 2474     Line/device assessment(s) completed for medical necessity    Constitutional: No acute distress  Respiratory: No visible dyspnea, equal chest rise and fall, audible upper airway rhonchi much improved today. Some fine crackles at the bases, right LL diminished.   Cardiovascular: HRR, no murmurs, rubs,thrills. No peripheral edema.   GI: Soft,nontender, bowel sounds are positive.  Skin/Integumen: No rashes, open areas. He does have widespread bruising bilateral arms that he and son state is from his fall several days ago.         Medications     - MEDICATION INSTRUCTIONS -         aspirin  81 mg Oral Daily     atorvastatin  80 mg Oral Daily     azithromycin  500 mg Intravenous Q24H     cefTRIAXone  1 g Intravenous Q24H     enoxaparin ANTICOAGULANT  30 mg Subcutaneous Q24H     levothyroxine  100 mcg Oral Daily     sodium chloride (PF)  3 mL Intracatheter Q8H     ticagrelor  90 mg Oral BID       Data   Recent Labs   Lab 10/23/20  0553 10/22/20  0516 10/21/20  1358 10/21/20  0516 10/21/20  0516 10/20/20  1204  10/20/20  1204   WBC 8.4 8.8  --   --  9.9  --   --    HGB 9.2* 9.0*  --   --  9.1*  --   --    MCV 98 99  --   --  98  --   --     353  --   --  325  --   --    * 144  --   --  143  --   --    POTASSIUM 3.6 3.2*  --   --  3.7  --   --    CHLORIDE 117* 113*  --   --  114*  --   --    CO2 23 25  --   --  22  --   --    BUN 56* 50*  --   --  41*  --   --    CR 1.60* 1.65*  --   --  1.42*  --   --    ANIONGAP 6 6  --   --  7  --   --    LUH 8.1* 8.0*  --   --  8.0*  --   --    * 114*  --   --  122*  --   --    ALBUMIN 2.1* 2.1*  --   --  2.4*   < >  --    PROTTOTAL 6.0* 5.8*  --   --  6.0*   < >  --    BILITOTAL 0.3 0.4  --   --  0.3   < >  --    ALKPHOS 75 70  --   --  68   < >  --    ALT 49 52  --   --  39   < >  --    AST 98* 135*  --   --  79*   < >  --    LIPASE  --   --   --   --   --   --  68*   TROPI 14.704* 28.999* 26.439*   < >  --    < > 4.521*    < > = values in this interval not displayed.       No results found for this or any previous visit (from the past 24 hour(s)).

## 2020-10-24 NOTE — PLAN OF CARE
Absorbed patient from [id number]. Patient has been intermittently swinging legs over side of bed. Calm overall. VSS upon handoff to this writer.       Face to face report given with opportunity to observe patient.    Report given to ALLAN Magdaleno   10/23/2020  11:28 PM

## 2020-10-24 NOTE — PLAN OF CARE
Attempted to feed patient mashed potatoes and gravy and also some sips of thickened water. Patient struggled, gagging, coughing and choking. Assisted patient to cough and deep breathe. Non productive cough.

## 2020-10-24 NOTE — PHARMACY-MEDICATION REGIMEN REVIEW
[facility]    Pharmacy      Antimicrobial Stewardship Note     Current antimicrobial therapy:  Anti-infectives (From now, onward)    Start     Dose/Rate Route Frequency Ordered Stop    10/24/20 1200  piperacillin-tazobactam (ZOSYN) infusion 2.25 g      2.25 g  over 30 Minutes Intravenous EVERY 6 HOURS 10/24/20 1139      10/20/20 1445  azithromycin (ZITHROMAX) 500 mg in sodium chloride 0.9 % 250 mL intermittent infusion      500 mg  275 mL/hr over 1 Hours Intravenous EVERY 24 HOURS 10/20/20 1441 10/25/20 1629          Indication: Aspiration Pneumonia     Days of Therapy: 5 Zithromax, Day 1 Zosyn     Pertinent labs:  Creatinine   Creatinine   Date Value Ref Range Status   10/24/2020 1.65 (H) 0.66 - 1.25 mg/dL Final   10/23/2020 1.60 (H) 0.66 - 1.25 mg/dL Final   10/22/2020 1.65 (H) 0.66 - 1.25 mg/dL Final     WBC   WBC   Date Value Ref Range Status   10/24/2020 11.6 (H) 4.0 - 11.0 10e9/L Final   10/23/2020 8.4 4.0 - 11.0 10e9/L Final   10/22/2020 8.8 4.0 - 11.0 10e9/L Final     Procalcitonin   Procalcitonin   Date Value Ref Range Status   10/24/2020 0.32 ng/ml Final     Comment:     0.25-0.49 ng/ml  Possible early systemic infection or localized infection.     Recommendation: Encourage antibiotics only in the correct clinical context.   Consider obtaining blood cultures or other relevant cultures. Recheck PCT in   6-12 hours to ensure baseline low level. If repeat PCT is rising, consider   early systemic infection and consider starting antibiotics.     10/22/2020 0.80 ng/ml Final     Comment:     0.50-1.99 ng/ml  Moderate risk of systemic infection.  Recommendation:   Recommend antibiotics. Evaluate culture results and clinical features to   target antibacterial therapy. Obtain blood cultures and other relevant   cultures if not done.  If empiric antibiotics were started, recheck PCT in: 2 days to guide   antibiotic de-escalation.  Discontinue or de-escalate antibiotics when PCT   concentration is <80% of  peak or abs PCT <0.5. If empiric antibiotics were NOT   started, recheck PCT in 6-24 hours to re-evaluate need for antibiotics.     10/20/2020 0.40 ng/ml Final     Comment:     0.25-0.49 ng/ml  Possible early systemic infection or localized infection.     Recommendation: Encourage antibiotics only in the correct clinical context.   Consider obtaining blood cultures or other relevant cultures. Recheck PCT in   6-12 hours to ensure baseline low level. If repeat PCT is rising, consider   early systemic infection and consider starting antibiotics.       CRP   CRP Inflammation   Date Value Ref Range Status   10/20/2020 148.0 (H) 0.0 - 8.0 mg/L Final     Culture Results:   (10/21/20) Sputum  (10/21/20) Sputum gram stain = few budding yeast  (10/20/20) Blood  (10/21/20) COVID = negative  (10/24/20) COVID     Recommendations/Interventions:  1. Trend procalcitonin  2. Transition to oral when able       Jose Antonio Strong, [facility]  October 24, 2020

## 2020-10-24 NOTE — PLAN OF CARE
Pt gets up without calling.  Redirectable.  Frequently urinates.  Daughter called to ask for pt to get shave and give him reminders that her and her brother are checking on him.    Face to face report given with opportunity to observe patient.  Alarm on and call light in reach.    Report given to Grace LEMUS at 1510    Jimmy Herron RN   10/23/2020  10:41 PM

## 2020-10-24 NOTE — PLAN OF CARE
Very restless this shift. VSS. Heart rate irregular. Left lung remains coarse with fine crackles, remains on RA. Productive cough with yellow sputum. Declining with nectar thick/mech soft diet, cannot follow directions as to swallow, choking/gagging/coughing. Pea size area noted on upper medial right buttock that is somewhat hardened and closed; barrier cream applied. Incontinent of urine and stool. Turned and repo q2h, although also independently repositioning. Attempted to stand with assist x2, did not tolerate well. Sat up in chair for over an hour. Alarms needed.    Face to face report given with opportunity to observe patient.    Report given to ALLAN Rick RN   10/24/2020  6:03 PM

## 2020-10-24 NOTE — PROGRESS NOTES
Inpatient Physical Therapy Evaluation    Name: Armand Hernandez MRN# [id number]   Age: 89 year old YOB: 1917     Date of Consultation: 2020  Consultation is requested by:  Kinjal Benz NP  Specific Consultation Request:  weakness, s/p NSTEMI  Primary care provider: Nia Savage    General Information:   Onset Date: 10/20/20 but had been feeling weak at home in prior weeks     History of Current Problem/Admission: Generalized muscle weakness [M62.81]  NSTEMI (non-ST elevated myocardial infarction) (H) [I21.4]  Pneumonia of left upper lobe due to infectious organism [J18.9]  Proteinuria, unspecified type [R80.9]    Prior Level of Function: Patient was living along independently at home and driving. Community distance ambulator. Patient had recently switched to FWW from SPC.   High level function prior to admission and in recent weeks.   Ambulation: 1 - Assistive Equipment   Transferrin - Assistive Equipment   Toiletin - Assistive Equipment    Bathin - Assistive Equipment   Dressin - Independent   Eatin - Independent   Communication: 0 - Understands/communicates without difficulty  Swallowing: NA  Cognition: 0 - no cognitive issues reported    Fall history within the last 6 months: Yes    Current Living Situation: Patient unable to report. Increased confusion from baseline.   Current Equipment Used at Home: FWW and SPC     Patient & Family Goals: Patient unable to vocalize     Past Medical History:   Past Medical History:   Diagnosis Date     AAA (abdominal aortic aneurysm) (H)      Arthritis      Thyroid disease        Past Surgical History:  Past Surgical History:   Procedure Laterality Date     AAA REPAIR       EYE SURGERY      cataract both eyes     HEAD & NECK SURGERY      abcess removal,     HERNIA REPAIR      hernia x 2     REPAIR ECTROPION BILATERAL  2014    Procedure: REPAIR ECTROPION BILATERAL;  BILATERAL REPAIR OF ECTROPION;  Surgeon: Titi Yo,  MD;  Location: [facility]       Medications:   Current Facility-Administered Medications   Medication     acetaminophen (TYLENOL) tablet 650 mg     aspirin EC tablet 81 mg     atorvastatin (LIPITOR) tablet 80 mg     azithromycin (ZITHROMAX) 500 mg in sodium chloride 0.9 % 250 mL intermittent infusion     enoxaparin ANTICOAGULANT (LOVENOX) injection 30 mg     levothyroxine (SYNTHROID/LEVOTHROID) tablet 100 mcg     lidocaine (LMX4) kit     lidocaine 1 % 0.1-1 mL     melatonin tablet 1 mg     morphine sulfate HIGH CONCENTRATE (ROXANOL) 10 mg/0.5 mL (HIGH CONC) solution 5 mg     naloxone (NARCAN) injection 0.1-0.4 mg     OLANZapine (zyPREXA) tablet 2.5 mg     ondansetron (ZOFRAN-ODT) ODT tab 4 mg    Or     ondansetron (ZOFRAN) injection 4 mg     Patient is already receiving anticoagulation with heparin, enoxaparin (LOVENOX), warfarin (COUMADIN)  or other anticoagulant medication     piperacillin-tazobactam (ZOSYN) infusion 2.25 g     sodium chloride (PF) 0.9% PF flush 3 mL     sodium chloride (PF) 0.9% PF flush 3 mL     ticagrelor (BRILINTA) tablet 90 mg       Weight Bearing Status: NA     Cognitive Status Examination:  Orientation: lethargic and confused  Level of Consciousness: lethargic and confused  Follows Commands and Answers Questions: unable to follow commands   Personal Safety and Judgement: Impaired  Memory: impairments present. Patient unable to answer questions. States he needs to go to the hospital. Discussed with patient that he is in a hospital and being cared for.       Pain:   Currently in pain? No      Physical Therapy Evaluation:   Integumentary/Edema: NA  ROM: Functional ROM present, but difficult for patient to use full motion of LEs for standing or transfers, appears to be related to global LE and trunk weakness.  Strength: Large deficits present. Patient is able to sit EOB unsupported for short period of time, but then requires Min A to maintain position. With sit to stand transfer, patient has global  weakness in LEs and is unable to come to full stand position.   Bed Mobility: Max A. Patient unable to perform without assist to both legs and trunk for performance.   Transfers: Patient was heavy Max A of 2 for stand pivot transfer. Patient was not able to come to full stand (crouched pattern with knees slightly flexed). Patient had difficulty with moving feet and required assist at pelvis and lower trunk to direct the transfer. Recommending use of Ez-stand or Eduardo for patient and staff safety for future transfers unless functional strength and mobility improve.   Gait: Not able to perform   Stairs: NA  Balance: significant deficits present. Able to sit EOB for short periods independently, but then requires Min A. Standing balance poor.   Sensory: NT   Coordination: NT    Physical Therapy Interventions: Balance , bed mobility, functional mobility training  Clinical Impressions:  Criteria for Skilled Therapeutic Intervention Met: Yes, treatment indicated  Will  at this time, but if decline PT services may be of no benefit to patient and may consider discharging order. Will see patient status on Monday  PT Diagnosis: profound change from baseline with significant decline in functional mobility, strength and balance.   Influenced by the following impairments: global weakness  Functional limitations due to impairment: heavy assist for transfers,fair seated balance, poor standing balance   Clinical presentation: Unstable/Unpredictable  Clinical presentation rationale: clinical judgement  Clinical Decision making (complexity): Moderate Complexity  Frequency: 5 times/week  Predicted Duration of Therapy Intervention (days/wks): 5 days  Anticipated Discharge Disposition: TCU or Long Term Care Facility dependent upon patient status   Anticipated Equipment Needs at Discharge: NA  Risks and Benefits of therapy have been explained: Yes  Patient, Family & other staff in agreement with plan of care: Yes    Clinical  Impression Comments: Productive cough occurring during session. At end of session, patient up in chair with warm blankets for comfort. Personal alarm on, call button and phone in reach. Kinjal Benz NP updated on patient status during PT eval. LPN assisted with session.     Total Eval Time: 25

## 2020-10-24 NOTE — PROGRESS NOTES
[facility]    Hospitalist Progress Note    Date of Service (when I saw the patient): 10/24/2020    Assessment & Plan       NSTEMI (non-ST elevated myocardial infarction) (H): Presenting symptom LE weakness and fall at home. Denies chest pain, dyspnea. Has noted a cough for the last several days prior to presentation. His troponin on arrival was 4.98, today up to 29 but seems to be hitting plateau as previous value yesterday was 28. He declined transfer to higher level of care for cardiac investigation or intervention. He has continued to deny dyspnea or chest pain since admission. He completed 48 hours full anticoagulation and was started on Brilinta after completion.   -10/22: Nursing staff noted visible dyspnea though patient denies as well as worsening crackles to bilateral lungs. He was given single dose IV lasix, had some diuresis though exact amounts unknown due to incontinence. Since he denies dyspnea in the first place no change in symptoms. He is breathing comfortably on my assessment.     -10/23: No hypoxia, wet cough continues-likely due to aspiration. Bases are clear, no peripheral edema. Will hold off on Lasix again today as he has no symptoms and recheck Xray in the AM.       Pneumonia of left upper lobe due to infectious organism: Noted on CT scan. Once asked patient confirmes cough over previous several days. Isolated left upper lobe is unusual. Repeat CXR this morning shows no change from CT scan. He has minimal elevation in Procal, persistent fever, normal WBC. He was started on rocephin and zithromax on arrival. Will continue for complete course. Covid swab was negative, however will retest with the persistent fever, cough x several days and weakness.   -10/23: Fever curve improving, no hypoxia. Still wet loose cough. Recheck procal in AM.   -10/24: Fever resolved x24 hours. WBC trending up, but still mild.  However, chest xray looking worse with widespread pneumonia. Possibly viral.  2nd Covid test pending. Continue Azithromycin, switch Rocephin to Zosyn to better cover aspiration pneumonia. He does not have risk factors for MRSA.    Possible aspiration: At bedside patient is having difficulty with wet cough after thin liquids, speech therapy consult placed for formal evaluation. He acknowledges that he is choking on thin liquids, says  This only happens rarely at home normally. Could be just due to overall weakness and attempting to take in oral while laying in bed.   -10/23: Speech evaluation did reveal signs of aspiration with clear liquids. Recommend nectar thick liquids with mechanical soft diet. Suspect he will be unwilling/unable to continue this modified texture at home, however the hope is he will continue to gain strength and be safer with thin liquid swallow.   -10/24: Still having some upper airway rhonchi after nectar liquids if he is allowed to drink himself. Will require 1:1 for oral intake, push oral fluids, no straws.      Anemia: Unknown chronicity, stable considering IVF administration on arrival. Will follow.       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 He has no known previous history of CHF. He denies any orthopnea, has no hypoxia or chest pain. He was given lasix last night but bumped renal panel a bit this morning. BNP quite a bit more elevated now at 45K compared to ED value of 19K. He has no significant peripheral edema at this time. Will not give any further lasix dosing today, re-evaluate in the AM along with renal panel.   10/23: Hold of on lasix today, repeat cxr in AM.   10/24: CXR does not show prominent pulmonary edema, creatinine trending up slightly, sodium elevated, will increase free water. No peripheral edema, orthopnea or crackles in the bases. No hypoxia. Will push free fluids, will give IVF if needed and cannot increase  intake.        Pulmonary hypertension (H): Moderate on ECHO, no dyspnea, no hypoxia at this time.       DVT Prophylaxis: Enoxaparin (Lovenox) SQ  Code Status: No CPR- Do NOT Intubate    Disposition: Expected discharge in 1-3 days once cardiac function stable.    Kinjal Benz,  CNP    Interval History    Denies chest pain, dyspnea, abdominal pain or nausea. More weak today compared to yesterday, responding appropriately to me but very fidgety. Had a long discussion with his daughter this morning regarding worsening of weakness, confusion and CXR. She is understandably upset about visitation restrictions. She verbalizes that he has been very active man and she wants everything possible done to try to improve his health.     -Data reviewed today: I reviewed all new labs and imaging results over the last 24 hours. I personally reviewed no images or EKG's today.    Physical Exam   Temp: 98.8  F (37.1  C) Temp src: Tympanic BP: 121/91 Pulse: (!) 133   Resp: 20 SpO2: 92 % O2 Device: None (Room air)    Vitals:    10/22/20 0557 10/23/20 0521 10/24/20 0515   Weight: 65.4 kg (144 lb 2.9 oz) 64.9 kg (143 lb 1.3 oz) 64.4 kg (141 lb 15.6 oz)     Vital Signs with Ranges  Temp:  [97.5  F (36.4  C)-99.2  F (37.3  C)] 98.8  F (37.1  C)  Pulse:  [] 133  Resp:  [18-22] 20  BP: (101-128)/(59-91) 121/91  SpO2:  [90 %-92 %] 92 %  I/O last 3 completed shifts:  In: 510 [P.O.:510]  Out: 650 [Urine:650]    Peripheral IV 10/20/20 Right  (Active)   Site Assessment WDL 10/24/20 0100   Line Status Saline locked 10/24/20 0100   Phlebitis Scale 0-->no symptoms 10/24/20 0100   Infiltration Scale 0 10/24/20 0100   Number of days: 4       Peripheral IV 10/20/20 Left Lower forearm (Active)   Site Assessment WDL 10/24/20 0100   Line Status Infusing 10/24/20 0100   Phlebitis Scale 0-->no symptoms 10/24/20 0100   Infiltration Scale 0 10/24/20 0100   Number of days: 4       Incision/Surgical Site 01/14/14 Bilateral;Lower Eye (Active)   Number  of days: 2475     Line/device assessment(s) completed for medical necessity    Constitutional: No acute distress  Respiratory: No visible dyspnea, equal chest rise and fall, audible upper airway rhonchi worsened today. Diminished throughout but no notable crackles, rhonchi, wheezing.   Cardiovascular: HRR, no murmurs, rubs,thrills. No peripheral edema.   GI: Soft,nontender, bowel sounds are positive.  Skin/Integumen: No rashes, open areas. He does have widespread bruising bilateral arms that he and son state is from his fall several days ago.         Medications     - MEDICATION INSTRUCTIONS -         aspirin  81 mg Oral Daily     atorvastatin  80 mg Oral Daily     azithromycin  500 mg Intravenous Q24H     cefTRIAXone  1 g Intravenous Q24H     enoxaparin ANTICOAGULANT  30 mg Subcutaneous Q24H     levothyroxine  100 mcg Oral Daily     sodium chloride (PF)  3 mL Intracatheter Q8H     ticagrelor  90 mg Oral BID       Data   Recent Labs   Lab 10/24/20  0508 10/23/20  0553 10/22/20  0516 10/21/20  1358 10/20/20  1204 10/20/20  1204   WBC 11.6* 8.4 8.8  --    < >  --    HGB 9.6* 9.2* 9.0*  --    < >  --    MCV 99 98 99  --    < >  --    * 366 353  --    < >  --    * 146* 144  --    < >  --    POTASSIUM 3.7 3.6 3.2*  --    < >  --    CHLORIDE 118* 117* 113*  --    < >  --    CO2 21 23 25  --    < >  --    BUN 58* 56* 50*  --    < >  --    CR 1.65* 1.60* 1.65*  --    < >  --    ANIONGAP 9 6 6  --    < >  --    LUH 8.4* 8.1* 8.0*  --    < >  --    * 109* 114*  --    < >  --    ALBUMIN  --  2.1* 2.1*  --    < >  --    PROTTOTAL  --  6.0* 5.8*  --    < >  --    BILITOTAL  --  0.3 0.4  --    < >  --    ALKPHOS  --  75 70  --    < >  --    ALT  --  49 52  --    < >  --    AST  --  98* 135*  --    < >  --    LIPASE  --   --   --   --   --  68*   TROPI  --  14.704* 28.999* 26.439*   < > 4.521*    < > = values in this interval not displayed.       Recent Results (from the past 24 hour(s))   XR Chest Port 1 View     Narrative    PROCEDURE:  XR CHEST PORT 1 VW    HISTORY:  F/U DONALDO pneumonia, possible pulmonary edema.     COMPARISON:  October 22, 2020    FINDINGS:   The cardiac silhouette is normal in size. The pulmonary vasculature is  normal.  Left lung opacity seen previously has worsened significantly  from previous examination. There is also some new right lung opacity  and perihilar distribution. No pleural effusion or pneumothorax.      Impression    IMPRESSION:  Worsening bilateral pulmonary parenchymal opacities from  previous examination on October 22, 2020      GABRIELA BELCHER MD

## 2020-10-24 NOTE — PROGRESS NOTES
"   10/24/20 1200   Signing Clinician's Name / Credentials   Signing clinician's name / credentials Tiesha Navarro, DPRACHEL   Quick Adds   Rehab Discipline PT   PT Discharge Planning    PT Discharge Recommendation (DC Rec) Long term care facility  (Or TCU dependent upon patient status)   PT Rationale for DC Rec Heavy level of physical assist needed. Patient far from baseline with current functional mobility status as well as confusion.    PT Brief overview of current status  Patient was heavy Max A of 2 for stand pivot transfer. Patient was not able to come to full stand (crouched pattern with knees slightly flexed). Patient had difficulty with moving feet and required assist at pelvis and lower trunk to direct the transfer. Recommending use of Ez-stand or Eduardo for patient and staff safety for future transfers unless functional strength and mobility improve. Seated balance fair. Short durations sitting EOB unsupported, intermittently requiring Min A to maintain seated balance. Max A x 2 for bed mobility due to difficulty of patient sequencing LE movement and trunk movement. Patient confusion present during session. Also productive cough during session.    Additional Documentation   PT Plan Will keep patient on PT caseload currently, but depending upon patient status may not benefit from PT services. Will determine over course of pateint's stay based on medical status and communication with patient's care team.    [facility] Stir-PAC TM \"6 Clicks\"   2016, Trustees of [employer], under license to [employer].  All rights reserved.   6 Clicks Short Forms Basic Mobility Inpatient Short Form   [facility] AM-PAC  \"6 Clicks\" V.2 Basic Mobility Inpatient Short Form   1. Turning from your back to your side while in a flat bed without using bedrails? 2 - A Lot   2. Moving from lying on your back to sitting on the side of a flat bed without using bedrails? 2 - A Lot   3. Moving to and from a bed to a chair " (including a wheelchair)? 1 - Total   4. Standing up from a chair using your arms (e.g., wheelchair, or bedside chair)? 2 - A Lot   5. To walk in hospital room? 1 - Total   6. Climbing 3-5 steps with a railing? 1 - Total   Basic Mobility Raw Score (Score out of 24.Lower scores equate to lower levels of function) 9

## 2020-10-24 NOTE — PLAN OF CARE
Alert with confusion. [place]. Awake, but calm most of night. VSS. Afebrile. Denies pain. LS coarse crackles. No use of O2. BS active, incontinent BM this shift.  Mechanical soft diet with nectar thick liquids. Sat up right with small sips to take medication, cough noted. Scattered bruising to skin. IV x2, Rocephin and Zithro continue, none given this shift.    Face to face report given with opportunity to observe patient.    Report given to ALLAN Puga RN   10/24/2020  7:21 AM

## 2020-10-25 NOTE — PLAN OF CARE
Sat with patient while he took his medications crushed, and had something to eat. He had a pudding, applesauce, and some berries pureed. He tolerated it well. Patient also had thickened apple juice. Improved from feeding yesterday 10/24/20. Productive cough.

## 2020-10-25 NOTE — PLAN OF CARE
Face to face report given with opportunity to observe patient.    Report given to Grace Sanz RN   10/25/2020  3:23 PM

## 2020-10-25 NOTE — PLAN OF CARE
VSS. Heart rhythm irregular. Lungs coarse throughout. Pt tolerated mech soft/nectar thick diet better today than day prior. Medications crushed in applesauce, tolerated well. Productive cough, thick brown/yellow sputum. Encouraged patient to cough and excrete phlegm. Fluids encouraged. IV infusing D5 at 100/hr. Heavy assist x2 . Incontinent/continent of stool and urine. Alarms active needed.     Face to face report given with opportunity to observe patient.    Report given to Latisha Appiah RN   10/25/2020  3:46 PM

## 2020-10-25 NOTE — PLAN OF CARE
Patient transferred to the [facility] floor, his son is in the room shaving patients face, patient appears content.

## 2020-10-25 NOTE — PROGRESS NOTES
Checked in with son, Say to discuss recommendations from physical therapy.  He advised that he would need to speak with his sister when discussing choice of vendor.   He also explained that his [facility] was planning to [facility] in [facility] to [facility] Gordan [facility].

## 2020-10-25 NOTE — PROVIDER NOTIFICATION
DATE:  10/25/2020   TIME OF RECEIPT FROM LAB:  0540  LAB TEST:  Sodium  LAB VALUE:  152  RESULTS GIVEN WITH READ-BACK TO (PROVIDER): Dr. Michel   TIME LAB VALUE REPORTED TO PROVIDER:   0599

## 2020-10-25 NOTE — PROGRESS NOTES
"Nutrition care review r/t length of stay.  89 yom admitted with MI, pneumonia.  Hx noted to include:  CHF, HTN.   Ht-66\", Wt-140#.  IBWR is 128-156#.  BMI is 23.    No weight hx noted.    Pt is NPO at this time awaiting speech rx evaluation r/t dysphagia/aspiration.  Labs/meds reviewed.    Estimated nutritional needs are:  3193-2313 calories (25-30 kcal/kg), 64 gm protein (1.0 gm/kg).    Pt has had poor, less than optimal intake x 5 days.    Follow speech rx recommendations regarding po intake.  RD will continue to follow and make further recommendations as indicated.    "

## 2020-10-25 NOTE — PLAN OF CARE
Provided communication board to patient to attempt to understand his needs better. Only able to minimally use.    Discussed with CLINT Layton about patient increased cough, difficulty tolerating the pudding thickened. Will reduce oral intake until speech eval this AM. Receiving IV fluids at this time.

## 2020-10-25 NOTE — PLAN OF CARE
"Reason for hospital stay:  NSTEMI  Living situation PTA: Comes from home  Most recent vitals: /92   Pulse 94   Temp 97.2  F (36.2  C) (Tympanic)   Resp 20   Ht 1.676 m (5' 6\")   Wt 64.4 kg (141 lb 15.6 oz)   SpO2 94%   BMI 22.92 kg/m    Pain Management:  SUSANA verbally. PAINAD score of 1; no interventions   LOC:  Disoriented x4, garbled speech. Did sleep ok tonight.   Cardiac:  HR irregular; BP stable   Respiratory:  Coarse throughout. Tachypneic as times. Coughing/difficulty tolerating secretions. Pulmonary hygiene promoted. Sats mid 90s on RA.   GI:  BS hypoactive. Fecal incontinence. No signs of nausea/tenderness.   :  Incontinence   Skin Issues:  Many bruised areas, blanchable redness to buttocks.   IVF:  Saline lock  ABX:  Rocephin, azithromycin   Nutrition: Pudding thick; speech eval this AM. Trouble tolerating even the pudding thick.   Ambulation: Eduardo for transfer   Safety:  WDL; alarms on and audible. Close observation by UA.       Face to face report given with opportunity to observe patient.    Report given to ALLAN Puga   10/25/2020  7:15 AM      "

## 2020-10-25 NOTE — PROGRESS NOTES
[facility]    Hospitalist Progress Note    Date of Service (when I saw the patient): 10/25/2020    Assessment & Plan       NSTEMI (non-ST elevated myocardial infarction) (H): Presenting symptom LE weakness and fall at home. Denies chest pain, dyspnea. Has noted a cough for the last several days prior to presentation. His troponin on arrival was 4.98, today up to 29 but seems to be hitting plateau as previous value yesterday was 28. He declined transfer to higher level of care for cardiac investigation or intervention. He has continued to deny dyspnea or chest pain since admission. He completed 48 hours full anticoagulation and was started on Brilinta after completion.   -10/22: Nursing staff noted visible dyspnea though patient denies as well as worsening crackles to bilateral lungs. He was given single dose IV lasix, had some diuresis though exact amounts unknown due to incontinence. Since he denies dyspnea in the first place no change in symptoms. He is breathing comfortably on my assessment.     -10/23: No hypoxia, wet cough continues-likely due to aspiration. Bases are clear, no peripheral edema. Will hold off on Lasix again today as he has no symptoms and recheck Xray in the AM.    -10/24: Improved today, less rhonchi, tolerating spoon fed honey liquids. Stronger during transfer. Will stop lipitor in  that is contributing to profound weakness.       Pneumonia of left upper lobe due to infectious organism, probably due to aspiration: Noted on CT scan. Once asked patient confirmes cough over previous several days. Isolated left upper lobe is unusual. Repeat CXR this morning shows no change from CT scan. He has minimal elevation in Procal, persistent fever, normal WBC. He was started on rocephin and zithromax on arrival. Will continue for complete course. Covid swab was negative, however will retest with the persistent fever, cough x several days and weakness.   -10/23: Fever curve improving, no  hypoxia. Still wet loose cough. Recheck procal in AM.   -10/24: Fever resolved x24 hours. WBC trending up, but still mild.  However, chest xray looking worse with widespread pneumonia. Possibly viral. 2nd Covid test pending. Continue Azithromycin, switch Rocephin to Zosyn to better cover aspiration pneumonia. He does not have risk factors for MRSA.  -10/25: Significant improvement on exam from yesterday. Zithromax course is complete, continue Zosyn. Signs of aspiration are absent with spoon fed honey liquids today where as yesterday he had not control even with pudding textures. 2nd COVID negative.    Possible aspiration: At bedside patient is having difficulty with wet cough after thin liquids, speech therapy consult placed for formal evaluation. He acknowledges that he is choking on thin liquids, says  This only happens rarely at home normally. Could be just due to overall weakness and attempting to take in oral while laying in bed.   -10/23: Speech evaluation did reveal signs of aspiration with clear liquids. Recommend nectar thick liquids with mechanical soft diet. Suspect he will be unwilling/unable to continue this modified texture at home, however the hope is he will continue to gain strength and be safer with thin liquid swallow.   -10/24: Still having some upper airway rhonchi after nectar liquids if he is allowed to drink himself. Will require 1:1 for oral intake, push oral fluids, no straws.  -10/25:Signs of aspiration are absent with spoon fed honey liquids today where as yesterday he had not control even with pudding textures. Orders written for honey thick, 1:1 and spoon fed liquids only.     Hypernatremia: Due to poor oral intake and dehydration. Started on D5W, improvement noted over course of several hours. Will continue same for now, recheck later this evening.  Hopefully today will be able to take in oral fluids better today.    Dehydration: Unable to effectively swallow since arrival but  distinctly worse over the last 48 hours. Today however his alertness and weakness has improved significantly. IVF as above.     ELANA: Due to dehydration. Treat as above.       Anemia: Unknown chronicity, stable considering IVF administration on arrival. Will follow.       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 He has no known previous history of CHF. He denies any orthopnea, has no hypoxia or chest pain. He was given lasix last night but bumped renal panel a bit this morning. BNP quite a bit more elevated now at 45K compared to ED value of 19K. He has no significant peripheral edema at this time. Will not give any further lasix dosing today, re-evaluate in the AM along with renal panel.   10/23: Hold of on lasix today, repeat cxr in AM.   10/24: CXR does not show prominent pulmonary edema, creatinine trending up slightly, sodium elevated, will increase free water. No peripheral edema, orthopnea or crackles in the bases. No hypoxia.       Pulmonary hypertension (H): Moderate on ECHO, no dyspnea, no hypoxia at this time.       DVT Prophylaxis: Enoxaparin (Lovenox) SQ  Code Status: No CPR- Do NOT Intubate    Disposition: Expected discharge in 1-3 days once cardiac function stable.    Kinjal Benz,  CNP    Interval History    Denies chest pain, dyspnea, abdominal pain or nausea. Much stronger and alert today compared to yesterday and overall looking much improved. Good strong cough today. His breathing is less labored as well.     -Data reviewed today: I reviewed all new labs and imaging results over the last 24 hours. I personally reviewed no images or EKG's today.    Physical Exam   Temp: 97.2  F (36.2  C) Temp src: Temporal BP: 122/56 Pulse: 66   Resp: 22 SpO2: 97 % O2 Device: None (Room air)    Vitals:    10/23/20 0521 10/24/20 0515 10/25/20 0505   Weight: 64.9 kg (143 lb 1.3  oz) 64.4 kg (141 lb 15.6 oz) 63.7 kg (140 lb 6.9 oz)     Vital Signs with Ranges  Temp:  [94.4  F (34.7  C)-98.6  F (37  C)] 97.2  F (36.2  C)  Pulse:  [] 66  Resp:  [14-22] 22  BP: ()/(56-92) 122/56  SpO2:  [92 %-97 %] 97 %  I/O last 3 completed shifts:  In: 556 [P.O.:90; I.V.:466]  Out: 100 [Urine:100]    Peripheral IV 10/20/20 Right  (Active)   Site Assessment WDL 10/25/20 1200   Line Status Infusing 10/25/20 1200   Dressing Intervention Dressing reinforced 10/25/20 0300   Phlebitis Scale 0-->no symptoms 10/25/20 1200   Infiltration Scale 0 10/25/20 1200   Infiltration Site Treatment Method  None 10/25/20 1200   If infiltrated, was a Vesicant infusing? No 10/25/20 1200   Number of days: 5       Incision/Surgical Site 01/14/14 Bilateral;Lower Eye (Active)   Number of days: 2476     Line/device assessment(s) completed for medical necessity    Constitutional: No acute distress  Respiratory: No visible dyspnea, equal chest rise and fall, audible upper airway rhonchi still present but he is much more able to clear it. Crackles LLL, no wheezes.  Cardiovascular: HRR, no murmurs, rubs,thrills. No peripheral edema.   GI: Soft,nontender, bowel sounds are positive.  Skin/Integumen: No rashes, open areas. He does have widespread bruising bilateral arms that he and son state is from his fall several days ago.         Medications     D5W 125 mL/hr at 10/25/20 0607     - MEDICATION INSTRUCTIONS -         aspirin  81 mg Oral Daily     heparin ANTICOAGULANT  5,000 Units Subcutaneous Q12H     levothyroxine  100 mcg Oral Daily     piperacillin-tazobactam  2.25 g Intravenous Q6H     sodium chloride (PF)  3 mL Intracatheter Q8H     ticagrelor  90 mg Oral BID       Data   Recent Labs   Lab 10/25/20  1120 10/25/20  0500 10/24/20  0508 10/23/20  0553 10/22/20  0516 10/21/20  1358 10/20/20  1204 10/20/20  1204   WBC  --  11.8* 11.6* 8.4 8.8  --    < >  --    HGB  --  9.8* 9.6* 9.2* 9.0*  --    < >  --    MCV  --  97 99 98 99   --    < >  --    PLT  --  478* 476* 366 353  --    < >  --    * 152* 148* 146* 144  --    < >  --    POTASSIUM 3.5 3.8 3.7 3.6 3.2*  --    < >  --    CHLORIDE 120* 121* 118* 117* 113*  --    < >  --    CO2 23 24 21 23 25  --    < >  --    BUN 73* 72* 58* 56* 50*  --    < >  --    CR 2.15* 2.04* 1.65* 1.60* 1.65*  --    < >  --    ANIONGAP 8 7 9 6 6  --    < >  --    LUH 8.2* 8.4* 8.4* 8.1* 8.0*  --    < >  --    * 133* 131* 109* 114*  --    < >  --    ALBUMIN  --   --   --  2.1* 2.1*  --    < >  --    PROTTOTAL  --   --   --  6.0* 5.8*  --    < >  --    BILITOTAL  --   --   --  0.3 0.4  --    < >  --    ALKPHOS  --   --   --  75 70  --    < >  --    ALT  --   --   --  49 52  --    < >  --    AST  --   --   --  98* 135*  --    < >  --    LIPASE  --   --   --   --   --   --   --  68*   TROPI  --   --   --  14.704* 28.999* 26.439*   < > 4.521*    < > = values in this interval not displayed.       No results found for this or any previous visit (from the past 24 hour(s)).

## 2020-10-25 NOTE — PLAN OF CARE
Patient was transferred from the [facility] out unit this afternoon, patient is alert and very pleasant, patient has coarse lungs, and upper airway congestion for which he is unable to cough up and of his secretions, patient was initially in a chair, and did need to be transferred to the bed via the EZ stand due to weakness, this writer and an additional two staff members attempted to transfer patient using the transfer belt, to no avail, patient was simply too weak to stand and take a step, patient was incontinent of a small amount of urine, and was changed accordingly, patient is a total feed, head of the bed was placed straight up, and a staff member was in the room for feeding and continued to observe the patient for a short time post feed, roxanol was administered for air hunger and secretions, patient tolerated well, and at this time, patient is more interactive with his son and staff. Patient was able to cough up some of the secretions. Vitals as charted. Patient does make his needs known, appetite poor.

## 2020-10-25 NOTE — PLAN OF CARE
Patient is alert and disoriented to place, situation, and time. Patient did not have good intake, unable to swallow safely, MD was notified and orders for pudding thickened liquids and Speech evaluation and treat were obtained. Patient tolerating pudding thickened liquids at this time but does have difficulty with pudding as it is slightly thinner than the liquids provided. Pills crushed and given in thickened liquid. VSS, pain determined with PAINAD scale to be a 5 and Tylenol given x1. Daughter into visit for majority of shift and patient appears much calmer and has not tried to exit the bed since her visit.     Face to face report given with opportunity to observe patient.    Report given to ALLAN Lane RN   10/25/2020  2:22 AM

## 2020-10-25 NOTE — PROVIDER NOTIFICATION
Patient choking on mashed potatoes and honey thickened liquids. Dr. Michel notified, new orders for pudding thick liquids received.

## 2020-10-25 NOTE — PHARMACY-MEDICATION REGIMEN REVIEW
[facility]    Pharmacy      Antimicrobial Stewardship Note     Current antimicrobial therapy:  Anti-infectives (From now, onward)    Start     Dose/Rate Route Frequency Ordered Stop    10/24/20 1200  piperacillin-tazobactam (ZOSYN) infusion 2.25 g      2.25 g  over 30 Minutes Intravenous EVERY 6 HOURS 10/24/20 1139          Indication: Aspiration Pneumonia     Days of Therapy: 6 Zithromax, Day 2 Zosyn     Pertinent labs:  Creatinine   Creatinine   Date Value Ref Range Status   10/25/2020 2.04 (H) 0.66 - 1.25 mg/dL Final   10/24/2020 1.65 (H) 0.66 - 1.25 mg/dL Final   10/23/2020 1.60 (H) 0.66 - 1.25 mg/dL Final     WBC   WBC   Date Value Ref Range Status   10/25/2020 11.8 (H) 4.0 - 11.0 10e9/L Final   10/24/2020 11.6 (H) 4.0 - 11.0 10e9/L Final   10/23/2020 8.4 4.0 - 11.0 10e9/L Final     Procalcitonin   Procalcitonin   Date Value Ref Range Status   10/25/2020 1.29 ng/ml Final     Comment:     0.50-1.99 ng/ml  Moderate risk of systemic infection.  Recommendation:   Recommend antibiotics. Evaluate culture results and clinical features to   target antibacterial therapy. Obtain blood cultures and other relevant   cultures if not done.  If empiric antibiotics were started, recheck PCT in: 2 days to guide   antibiotic de-escalation.  Discontinue or de-escalate antibiotics when PCT   concentration is <80% of peak or abs PCT <0.5. If empiric antibiotics were NOT   started, recheck PCT in 6-24 hours to re-evaluate need for antibiotics.     10/24/2020 0.32 ng/ml Final     Comment:     0.25-0.49 ng/ml  Possible early systemic infection or localized infection.     Recommendation: Encourage antibiotics only in the correct clinical context.   Consider obtaining blood cultures or other relevant cultures. Recheck PCT in   6-12 hours to ensure baseline low level. If repeat PCT is rising, consider   early systemic infection and consider starting antibiotics.     10/22/2020 0.80 ng/ml Final     Comment:     0.50-1.99 ng/ml   Moderate risk of systemic infection.  Recommendation:   Recommend antibiotics. Evaluate culture results and clinical features to   target antibacterial therapy. Obtain blood cultures and other relevant   cultures if not done.  If empiric antibiotics were started, recheck PCT in: 2 days to guide   antibiotic de-escalation.  Discontinue or de-escalate antibiotics when PCT   concentration is <80% of peak or abs PCT <0.5. If empiric antibiotics were NOT   started, recheck PCT in 6-24 hours to re-evaluate need for antibiotics.       CRP   CRP Inflammation   Date Value Ref Range Status   10/25/2020 158.0 (H) 0.0 - 8.0 mg/L Final   10/20/2020 148.0 (H) 0.0 - 8.0 mg/L Final     Culture Results:   (10/21/20) Sputum = contaminated; needs recollection  (10/21/20) Sputum gram stain = few budding yeast  (10/20/20) Blood  (10/21/20) COVID = negative  (10/24/20) COVID = negative  (10/25/20) C. Diff = negative  (10/25/20) Sputum = normal respiratory jose luis     Recommendations/Interventions:  1. Trend procalcitonin--- increased from yesterday  2. Transition to oral when able  3. C. Diff checked; add probiotics    Jose Antonio Strong, [facility]  October 25, 2020

## 2020-10-26 NOTE — PLAN OF CARE
Another sepsis flag-not ordered based on conversation with Dr Holt-awaiting order     1711 another sepsis flag-awaiting orders from Dr Holt to not order or draw lactic acid    1750 another sepsis flag -lactic not ordered per conversation with Dr Holt    roxanol given at 1735 Did not settle after receiving. Continues to be very restless, swinging arms and legs. 20 seconds of labored resps followed by 20 secs of apnea.    1820 sepsis flag again. Never rec'd order to do not draw lactic acid by provider but too verbal order     1915 another sepsis flag-not drawn per verbal order I took from Sharon    1923 another sepsis flag- not drawn per order this writer rec'd from Dr SOLIS    2045 Dr Michel in to see pt again and talk with family.    2101  Another sepsis flag-lactic not drawn    2112 Another sepsis flag-lactic not drawn    2120 another sepsis flag-lactic not drawn    2137 another sepsis flag-lactic not drawn    2232 Sepsis flag-lactic not drawn

## 2020-10-26 NOTE — PROGRESS NOTES
Attempted oral suction, patient swatted at writers hands and shook head to avoid younker.  One good spontanous cough noted.  Lungs remain coarse.

## 2020-10-26 NOTE — PLAN OF CARE
Son at bedside. Pt with very congested respirations. Text page to Dr SOLIS for scopolamine patch. Sepsis flag- spoke with Dr SOLIS -states do not draw Lactic.     5115 sepsis flag rec'd. Cancelled per my conversation with Dr Kidd

## 2020-10-26 NOTE — PROGRESS NOTES
Checked in with Kevin Smith.  Denies questions or concerns at this time.  CTS will continue to remain available for support and resources.

## 2020-10-26 NOTE — PHARMACY-MEDICATION REGIMEN REVIEW
[facility]    Pharmacy      Antimicrobial Stewardship Note     Current antimicrobial therapy:  Anti-infectives (From now, onward)    Start     Dose/Rate Route Frequency Ordered Stop    10/24/20 1200  piperacillin-tazobactam (ZOSYN) infusion 2.25 g      2.25 g  over 30 Minutes Intravenous EVERY 6 HOURS 10/24/20 1139          Indication: Aspiration Pneumonia     Days of Therapy: 7 Zithromax, Day 3 Zosyn     Pertinent labs:  Creatinine   Creatinine   Date Value Ref Range Status   10/26/2020 2.05 (H) 0.66 - 1.25 mg/dL Final   10/25/2020 2.01 (H) 0.66 - 1.25 mg/dL Final   10/25/2020 2.15 (H) 0.66 - 1.25 mg/dL Final     WBC   WBC   Date Value Ref Range Status   10/26/2020 13.3 (H) 4.0 - 11.0 10e9/L Final   10/25/2020 11.8 (H) 4.0 - 11.0 10e9/L Final   10/24/2020 11.6 (H) 4.0 - 11.0 10e9/L Final     Procalcitonin   Procalcitonin   Date Value Ref Range Status   10/26/2020 0.62 ng/ml Final     Comment:     0.50-1.99 ng/ml  Moderate risk of systemic infection.  Recommendation:   Recommend antibiotics. Evaluate culture results and clinical features to   target antibacterial therapy. Obtain blood cultures and other relevant   cultures if not done.  If empiric antibiotics were started, recheck PCT in: 2 days to guide   antibiotic de-escalation.  Discontinue or de-escalate antibiotics when PCT   concentration is <80% of peak or abs PCT <0.5. If empiric antibiotics were NOT   started, recheck PCT in 6-24 hours to re-evaluate need for antibiotics.     10/25/2020 1.29 ng/ml Final     Comment:     0.50-1.99 ng/ml  Moderate risk of systemic infection.  Recommendation:   Recommend antibiotics. Evaluate culture results and clinical features to   target antibacterial therapy. Obtain blood cultures and other relevant   cultures if not done.  If empiric antibiotics were started, recheck PCT in: 2 days to guide   antibiotic de-escalation.  Discontinue or de-escalate antibiotics when PCT   concentration is <80% of peak or abs PCT  <0.5. If empiric antibiotics were NOT   started, recheck PCT in 6-24 hours to re-evaluate need for antibiotics.     10/24/2020 0.32 ng/ml Final     Comment:     0.25-0.49 ng/ml  Possible early systemic infection or localized infection.     Recommendation: Encourage antibiotics only in the correct clinical context.   Consider obtaining blood cultures or other relevant cultures. Recheck PCT in   6-12 hours to ensure baseline low level. If repeat PCT is rising, consider   early systemic infection and consider starting antibiotics.       CRP   CRP Inflammation   Date Value Ref Range Status   10/25/2020 158.0 (H) 0.0 - 8.0 mg/L Final   10/20/2020 148.0 (H) 0.0 - 8.0 mg/L Final     Culture Results:   (10/21/20) Sputum = contaminated; needs recollection  (10/21/20) Sputum gram stain = few budding yeast  (10/20/20) Blood = negative  (10/21/20) COVID = negative  (10/24/20) COVID = negative  (10/25/20) C. Diff = negative  (10/25/20) Sputum = normal respiratory jose luis     Recommendations/Interventions:  1. Transition to oral when able  2. C. Diff checked; consider probiotics    Jose Antonio Strong, [facility]  October 26, 2020

## 2020-10-26 NOTE — PROVIDER NOTIFICATION
DATE:  10/25/2020   TIME OF RECEIPT FROM LAB:  2200  LAB TEST:  Potassium  LAB VALUE:  2.9  RESULTS GIVEN WITH READ-BACK TO (PROVIDER):  Dr. Michel  TIME LAB VALUE REPORTED TO PROVIDER:   2091

## 2020-10-26 NOTE — PLAN OF CARE
Lethargic, very restless. At times able to say yes/no, mostly incomprehensible words. Roxanol given q3h for pain control, restlessness, and work of breathing. Family states they think this dose helps but patient does continue to appear restless/anxious. VSS. BP soft. Sats adequate on room air but lung sounds are very coarse, appears to be having cheyne ureña respirations. Has a fair, loose cough at times. RT attempted to deep suction, patient did not like this and unable to get much out. No peripheral edema. IV running D5W at 100ml/hr. Continuing IV abx. Rg in place, poor urine output. Bottom is very red but blanchable, will apply sacral dressing. Repositioned at least every 2 hours in bed. No PO meds given this AM, unable to safely swallow at this time. Family remains at bedside. Continue to monitor.    Face to face report given with opportunity to observe patient.    Report given to Keesha Coates RN   10/26/2020  3:22 PM

## 2020-10-26 NOTE — PROGRESS NOTES
Sepsis Evaluation Progress Note    I was called to see Armand Hernandez due to abnormal vital signs triggering the Sepsis SIRS screening alert. He is known to have an infection.     Physical Exam   Vital Signs:  Temp: 97.2  F (36.2  C) Temp src: Tympanic BP: (!) 87/69 Pulse: 98   Resp: (!) 29 SpO2: 94 % O2 Device: None (Room air)       General: in no acute distress  Mental Status: altered level of consciousness based on confusion and fidgety.     Remainder of physical exam is significant for upper airway rhonchi.    Data   Lactic Acid   Date Value Ref Range Status   10/20/2020 1.6 0.7 - 2.0 mmol/L Final   10/20/2020 3.4 (H) 0.7 - 2.0 mmol/L Final     Comment:     Significant value called to and read back by  MAXIMO BARRY 10/20/20 AT 1224 BY [facility]         Assessment & Plan   Patient is currently being treated for aspiration pneumonia along with non-STEMI.  Currently getting antibiotic therapy treatment for his non-STEMI.  No plans on escalating therapy.    Disposition: The patient will remain on the current unit. We will continue to monitor this patient closely..  Devan Holt MD    Sepsis Criteria   Sepsis: 2+ SIRS criteria due to infection  Severe Sepsis: Sepsis AND 1+ new sign of acute organ dysfunction (Note: lactate >2 or acute encephalopathy each qualify as organ dysfunction)  Septic Shock: Sepsis AND hypotension despite volume resuscitation with 30 ml/kg crystalloid or lactate >=4  Note: HYPOTENSION is defined as 2 BP readings measured 3 hrs apart that have a SBP <90, MAP <65, or decrease >40 mmHg, occurring 6 hrs before or after t-zero

## 2020-10-26 NOTE — PLAN OF CARE
Face to face report given with opportunity to observe patient.    Report given to ALLAN Strickland.     Julianna Coates RN   10/26/2020  8:03 AM

## 2020-10-26 NOTE — PROGRESS NOTES
[facility]    Hospitalist Progress Note      Assessment & Plan   Armand Hernandez is a 89 year old male who was admitted on 10/20/2020.        1.  Non-ST elevation myocardial infarction: Patient came in with some dyspnea increased weakness no specific cardiac symptoms found to have an elevated troponin IV 0.98.  Conservative treatment was wished by the family.  He was treated with anticoagulation for 8 hours aspirin and Brilinta atorvastatin.  Beta-blocker was not started due to some borderline hemodynamics.  He peaked at 29 for his troponins.  Echo does show decreased LV function EF around 40%.  No real obvious signs of heart failure neck veins have been flat he has had no peripheral edema.  His weight is actually much less than when he came in.  Has had some issues with respiratory status has gone couple doses of Lasix not really sure that made a whole lot of difference.  Most of his pulmonary issues are clearance of airway.    2.  Pneumonia: Upon his admission his CT scan did show left upper lobe infiltrate.  His procalcitonin was negative white count not really elevated.  Treated him with Rocephin and azithromycin.  Covid was negative x2.  He was having some troubles perhaps swallowing at home prior to coming in the question of aspiration was raised also.  Here is had intermittent episodes of obvious aspiration.  Has been waxing and waning over 24-hour periods getting better and getting worse again.  Today he is markedly worse.  He is really has no strength to swallow or cough up his secretions a lot of gurgling.  Had obvious aspiration during evaluation by speech this morning.  He is now n.p.o.  He was switched over to Zosyn.  His chest x-ray repeated today continues to show his significant left-sided infiltrate also some on the right no change from several days ago.  One several days ago was markedly different from his admission.  Surprisingly his O2 sats remained stable on room air 94%.  RT tried  to suction him did not get much out at all he really did not like it.  Nothing further diuresis did not make any difference.  He had nebs attempted also did not seem to make much of a impact.  His overall prognosis given both his recent myocardial infarction and this underlying pneumonia is poor.  Terms of his pneumonia we will continue with the antibiotics.  Support with O2 as required.    3.  Aspiration: The biggest issue is just his profound weakness.  He is definitely had some ups and downs during his stay here was actually quite improved apparently the other day but now is markedly worse once again with difficulty even initiating swallow.  Clearing the secretions that are predominantly upper airway in terms of the gurgling he has now.  He needs to be n.p.o. I explained this to the family as did the speech pathologist.  Placing a nasogastric tube for feeding I think is an appropriate he is going to pull it out and/or then increase his risk for further aspiration.  For now I continue with  current cares if he gets strong enough to start to eat again so be it if not then and we are looking at definite comfort measures and hospice plan.    4.  Volume status/hyponatremia: BUN/creatinine have bumped up during his stay here.  His serum sodium is also elevated.  Is been getting just some D5W currently to try given some increased free water.  I think he is definitely on the volume depleted side overall.  I do not think that he is having much in terms of increased intravascular volume.  Would not give any further Lasix.  He is getting the D5W.  We will continue with this for now.  Urine outputs been on the lower side likely due to just volume depletion.    5.  Disposition: Has had discussions with family.  Patient is 89 years old.  Definitely this has caused him significant decline with a combination of both myocardial infarction and underlying pulmonary issues.  He is weakening on a daily basis.  High likelihood of  continued aspiration.  Do not feel that they are ready for hospice care.  We will continue with our current treatment plan antibiotics IV fluids n.p.o. status.  If he does get strong enough to start swallowing and so be it if not then I think moving towards a more comfort cares would be appropriate.  Diet: NPO per Anesthesia Guidelines for Procedure/Surgery Except for: Meds, Ice Chips  Fluids: D5W    DVT Prophylaxis: Heparin SQ  Code Status: No CPR- Do NOT Intubate    Disposition: Expected discharge clear depending upon his clinical course  Devan Holt    Interval History   Presents morning does oriented to speech and voice but is very fidgety and at times appears uncomfortable.  A lot of upper airway rhonchi present.  Speech is very difficult to understand.  Quite dry mouth.  Blood pressures little on the softer side all this afternoon at 94 systolic.  O2 sats remained stable on room air.  Chest x-ray not much different than previously.  Still shows his significant left-sided infiltrate and also some on the right.  I think this is more pneumonia/aspiration than fluid at this time.  Getting some morphine on a as needed basis.  Her goal is primarily comfort.  We will continue with her current regimen antibiotics medications that he can tolerate taking orally.  If he is not able to improve his swallowing status or if respiratory status declines further I think comfort measures are the best option.  We will need to continue discussing this with family.    -Data reviewed today: I reviewed all new labs and imaging results over the last 24 hours. I personally reviewed the chest x-ray image(s) showing Persistent significant left-sided infiltrate also right not much different than previously..    Physical Exam   Temp: 97.2  F (36.2  C) Temp src: Tympanic BP: 94/57 Pulse: 86   Resp: 20 SpO2: 94 % O2 Device: None (Room air)    Vitals:    10/24/20 0515 10/25/20 0505 10/26/20 0500   Weight: 64.4 kg (141 lb 15.6 oz) 63.7 kg  (140 lb 6.9 oz) 63.9 kg (140 lb 14 oz)     Vital Signs with Ranges  Temp:  [97.1  F (36.2  C)-98  F (36.7  C)] 97.2  F (36.2  C)  Pulse:  [78-98] 86  Resp:  [20-26] 20  BP: ()/(57-81) 94/57  SpO2:  [92 %-97 %] 94 %  I/O last 3 completed shifts:  In: 2524 [P.O.:600; I.V.:1424; IV Piggyback:500]  Out: 1060 [Urine:1060]    Peripheral IV 10/25/20 Anterior;Left Lower forearm (Active)   Site Assessment WDL 10/26/20 1211   Line Status Infusing 10/26/20 1211   Dressing Intervention New dressing  10/26/20 0658   Phlebitis Scale 0-->no symptoms 10/26/20 1211   Infiltration Scale 0 10/26/20 1211   Infiltration Site Treatment Method  None 10/25/20 1256   If infiltrated, was a Vesicant infusing? No 10/25/20 1256   Number of days: 1       Urethral Catheter (Active)   Tube Description Positional 10/26/20 0810   Catheter Care Done 10/26/20 0810   Collection Container Standard 10/26/20 0810   Securement Method Securing device (Describe) 10/26/20 0810   Rationale for Continued Use Strict 1-2 Hour I&O 10/26/20 0810   Urine Output 45 mL 10/26/20 1211   Number of days: 1       Incision/Surgical Site 01/14/14 Bilateral;Lower Eye (Active)   Number of days: 2477     No line/device    Constitutional: Patient awakens to voice difficult to understand him.  But at times looks very uncomfortable fidgety  HEENT: Normocephalic/atraumatic, PERRL, EOMI, mouth very dry, neck supple, no LN.     Cardiovascular: Difficult to really hear due to the upper airway rhonchi but RRR.  No murmur, no  rubs, or gallops.  JVD flat no.  Bruits no.  Pulses 2+    Respiratory: Lots of upper airway rhonchi difficult to hear much anything else.  Clear to auscultation bilaterally    Abdomen: Soft, nontender, nondistended, no organomegaly. Bowel sounds present    Extremities: Warm/dry. noedema    Neuro:   Non focal, cranial nerves intact, Moves all extremities.  Medications     D5W 100 mL/hr at 10/26/20 1211     - MEDICATION INSTRUCTIONS -         aspirin  81 mg  Oral Daily     heparin ANTICOAGULANT  5,000 Units Subcutaneous Q12H     levothyroxine  100 mcg Oral Daily     piperacillin-tazobactam  2.25 g Intravenous Q6H     sodium chloride (PF)  3 mL Intracatheter Q8H     ticagrelor  90 mg Oral BID       Data   Recent Labs   Lab 10/26/20  0524 10/25/20  2056 10/25/20  1120 10/25/20  0500 10/24/20  0508 10/23/20  0553 10/22/20  0516 10/20/20  1204 10/20/20  1204   WBC 13.3*  --   --  11.8* 11.6* 8.4 8.8   < >  --    HGB 9.2*  --   --  9.8* 9.6* 9.2* 9.0*   < >  --    MCV 99  --   --  97 99 98 99   < >  --    *  --   --  478* 476* 366 353   < >  --    * 149* 151* 152* 148* 146* 144   < >  --    POTASSIUM 3.6 2.9* 3.5 3.8 3.7 3.6 3.2*   < >  --    CHLORIDE 122* 117* 120* 121* 118* 117* 113*   < >  --    CO2 20 23 23 24 21 23 25   < >  --    BUN 68* 69* 73* 72* 58* 56* 50*   < >  --    CR 2.05* 2.01* 2.15* 2.04* 1.65* 1.60* 1.65*   < >  --    ANIONGAP 8 9 8 7 9 6 6   < >  --    LUH 7.8* 8.2* 8.2* 8.4* 8.4* 8.1* 8.0*   < >  --    * 160* 165* 133* 131* 109* 114*   < >  --    ALBUMIN  --   --   --   --   --  2.1* 2.1*   < >  --    PROTTOTAL  --   --   --   --   --  6.0* 5.8*   < >  --    BILITOTAL  --   --   --   --   --  0.3 0.4   < >  --    ALKPHOS  --   --   --   --   --  75 70   < >  --    ALT  --   --   --   --   --  49 52   < >  --    AST  --   --   --   --   --  98* 135*   < >  --    LIPASE  --   --   --   --   --   --   --   --  68*   TROPI 8.241*  --   --   --   --  14.704* 28.999*   < > 4.521*    < > = values in this interval not displayed.       Recent Results (from the past 24 hour(s))   XR Chest Port 1 View    Narrative    PROCEDURE:  XR CHEST PORT 1 VW    HISTORY:  f/u widespread pneumonia.     COMPARISON:  Tober 24th 2020    FINDINGS:   The cardiac silhouette is normal in size. The pulmonary vasculature is  normal.  There are bilateral pulmonary parenchymal opacities worse on  the left than the right. These have remained stable as compared  to  October 24, 2020. No pleural effusion or pneumothorax.      Impression    IMPRESSION:  No change from October 24, 2020      GABRIELA BELCHER MD

## 2020-10-26 NOTE — PLAN OF CARE
Discussed patient at morning meeting, will hold PT at this time unless patient has improvement in medical status and is better able to participate.

## 2020-10-26 NOTE — PROVIDER NOTIFICATION
Patient more congested, tachypneic, unable to clear cough. Nebs did not improve congestion or WOB. Dr. Michel notified, orders for IV lasix once.

## 2020-10-26 NOTE — PLAN OF CARE
Patient is alert and disoriented to situation, place and location. Patient aspirated at supper, made NPO pending Speech eval in AM. Potassium replacement initiated x1 rider given and 3 left to give. Recheck in AM per Dr. Michel. Antibiotics given as ordered. See previous notes regarding interventions this shift. VSS, afebrile, morphine sulfate given x1 for air hunger.     Face to face report given with opportunity to observe patient.    Report given to ALLAN Rees RN   10/25/2020  11:05 PM

## 2020-10-26 NOTE — PROGRESS NOTES
10/26/20 0800   Signing Clinician's Name / Credentials   Signing clinician's name / credentials Mi Breen MA, CCC-SLP   Quick Adds   Rehab Discipline SLP   Quick Adds Speech Language Pathology   SLP Discharge Planning    SLP Discharge Recommendation (DC Rec) Long term care facility  (Hospice)   SLP Rationale for DC Rec Patient not eliciting a complete swallow today and not maintaining alertness to participate in swallowing, recommend hospice if no change in function   SLP Brief overview of current status  NPO status, not eliciting a complete swallow, difficulty clearing secretions, recommend maintain elevated head of bed for easier breathing    Additional Documentation    Present No  (Family present)   Rehab Comments Pt degrading and unable to elicit a complete swallow.  SLP discussed with family patient's comfort level with not eating (not painful or hungry)   SLP Plan Follow-up if patient maintain alertness long enough to eat/drink   Total Session Time   Total Session Time (minutes) 30 minutes

## 2020-10-26 NOTE — PROGRESS NOTES
Arcenio for I/O. Trial of Lasix. d5w + potassium for hypernatremia and hypokalemia.    This might be a Hospice Care candidate    Early this morning, started fuentes ureña breathing. Notified daughter, Fabiana by phone. We discussed her father's difficult hospitalization, given his age and the various related issues that keep blocking progress; she was understanding and said she and her brother would try to visit early this am

## 2020-10-27 NOTE — DISCHARGE SUMMARY
[facility]  Hospitalist Discharge/ Death Summary      Date of Admission:  10/20/2020  Date of Discharge:  10/27/2020  Discharging Provider: Kenny Michel,       Discharge Diagnoses and primary causes of death    The patient was 89 years of age and enjoyed remarkable health and participated in community activities until his illnesses resulted in hospital admission on 10/20/2020.    The initial primary diagnoses and causes of death:    Community acquired pneumonia     Associated NSTEMI.    Given his relative good health, he was treated aggressively, but he slowly declined. He developed dysphagia and was exhibiting aspiration. Medical management changes were employed to address the changing situations, but the decline accelerated.    On early morning of 10/26/2020, the patient exhibited a more abrupt decline and began fuentes-ureña breathing and was becoming agitated. I phoned his daughter with the update and we decided to begin transitioning majority effort toward comfort measures. The daughter and his son came to the hospital.    The transition to primarily comfort cares had been decided, but the patient was becoming more agitated, had upper airway congestion and was exhibiting long apneic pauses. Hospice-like medical management with Roxanol, Ativan and Scopolamine were dose-adjusted to address symptoms    At 12:50 am 10/27/2020, the patient passed peacefully with daughter and son at bedside.         Kenny Michel,   [facility]  [address] [place] [id number]  Phone: [phone]  Fax: [phone]  ______________________________________________________________________

## 2020-10-27 NOTE — PLAN OF CARE
Patient is lethargic, does not awaken when this writer talks with him. BP is low. Sats are 77-80% on RA. On call MD notified. Peripheral pulses are present and palpable. Patient respirations are elevated. Audible congestion noted as he breathes. Lung sounds are course. Bowel sounds are hypoactive. No mottling noted to extremities. Oral cares done. Patient had incontinent stool x 1. He was cleaned and repositioned.

## 2020-10-27 NOTE — PLAN OF CARE
No falls or injuries this shift. Diff to turn Q2H as pt routinely becomes restless and thrashes in bed. Dr Michel in room to see pt and talk with family a number of times this shift. Have had Sepsis flag a number of times this shift. Took verbal order from Dr SOLIS at start of this shift-stating no lactic draws. IVF and IV abtx continues. No longer safe to swallow po meds. Poor U/O. Swab to mouth and lips as mouth very dry. [place] and son at bedside entire shift. Roxanol up to 10mg Qhr and has had 1 dose of IV ativan. Periods of apnea 15-19 secs. Will start scopolamine tis shift    2230 Relaxed-family at bedside. No thrashing noted. Scopalamine patch placed behind L) ear.     Face to face report given with opportunity to observe patient.    Report given to Jimmy Mauro RN   10/26/2020  11:22 PM

## 2020-10-27 NOTE — PROGRESS NOTES
Daughter and son wanted to discuss his care. I described the medications used by and the goals of Hospice Care and they elected to focus treatments on comfort.    Dosing range/frequency for Roxanol were increased, Ativan and Scopolamine were added. Most of the scheduled medications were stopped.

## 2020-10-27 NOTE — PLAN OF CARE
Family called this writer to the room to check on patient. They were concerned that he had stopped breathing. This writer went to assess and he was not breathing. Unable to hear apical pulse. Charge nurse also notified and she did not hear an apical pulse either. House supervisor and provider also contacted. Patient's family declines a clergy visit. They do not want an autopsy. Bereavement packet given to them. They would like to have [facility] as the  home. Patient belongings sent with family. Patient was cleaned up and house supervisor brought body to the morgue.

## 2021-03-02 NOTE — PLAN OF CARE
"Most recent vitals: /68   Pulse 78   Temp 98  F (36.7  C) (Tympanic)   Resp 21   Ht 1.676 m (5' 6\")   Wt 63.9 kg (140 lb 14 oz)   SpO2 93%   BMI 22.74 kg/m      Pain Management:  Patient has been receiving PRN Roxanol nearly q 2h for pain and dyspnea.    LOC:  Confused and disoriented. Speech is garbled. Unable  to verbalize needs.   Cardiac:  Distant heart sounds.   Respiratory: Patient very coarse throughout lungs at beginning of shift. Has congested and loose cough. Unable to cough up sputum. Had patient lay on side and cough with no luck. Has periods of tachypena but at about 0430 this morning, patient was having Cheyne Hernandez with occasional  periods of apnea anywhere from 7-15 seconds. Some Cheyne Hernandez. Sats have been maintaining >92%. MD Michel at bedside with writer. MD called patients family and updated on patient status.   GI:  BS hypoactive x 4. Smear of BM.   :  Rg cath in place.   Skin Issues:  Scattered bruises.     IVF:  D5 100 ml/hr.   ABX:  Zosyn    Nutrition: NPO, oral mucosa very dry. Attempted oral cares multiple times but patient would bite mouth swab.   ADL's:  Assist x 2, turned every 2 hours.   Ambulation:Bed rest.   Safety:  Frequent Safety checks, bed near nurses station.       Comments:      10/26/2020  7:40 AM  Julianna Coates RN    " Bronchoscopy note    ASA 3, intubated patient    Patient with acute respiratory failure, enlarging lung nodule along with hilar adenopathy with history of COPD and IV drug abuse and noncompliance, patient was supposed to have a follow-up as an outpatient for navigational bronchoscopy and EBUS but patient never followed up, patient was evaluated by Dr. Bereket Hendricks yesterday and after discussion it was decided that patient to have a bronchoscopy with endobronchial ultrasound and needle biopsy, also radial probe with biopsy of the right lower lobe nodule to be attempted, for that reason after informed consent, the patient was taken to the endoscopy suite, patient was intubated by the anesthesiologist, please refer to Mallampati scores as per anesthesia, the bronchoscope was induced by endotracheal tube using a T-piece adapter, patient was found to have extensive thick mucous plugs in the airways on both sides, the bronchoscope was wedged into the right lower lobe bronchus and BAL was done from the area subsequently bronchial brushings were done from the area, radial probe and biopsies were attempted from the right lower lobe for the nodule but did not appear to have any significant appearance on the radial probe itself to do the biopsy, subsequently the endobronchial ultrasound was inserted and the right hilar adenopathy was localized and multiple needle biopsies were done which was sent for cytology, patient tolerated the procedure well and did not have any apparent complications, patient was transferred to the recovery area after extubation by the anesthesiologist  Estimated blood loss was 0  Further management depending on patient's clinical status and the bronchoscopy results    Ignacio Colin MD